# Patient Record
Sex: FEMALE | Race: WHITE | NOT HISPANIC OR LATINO | Employment: OTHER | URBAN - METROPOLITAN AREA
[De-identification: names, ages, dates, MRNs, and addresses within clinical notes are randomized per-mention and may not be internally consistent; named-entity substitution may affect disease eponyms.]

---

## 2018-10-04 ENCOUNTER — APPOINTMENT (OUTPATIENT)
Dept: WOMENS IMAGING | Facility: HOSPITAL | Age: 73
End: 2018-10-04

## 2018-10-04 PROCEDURE — 77067 SCR MAMMO BI INCL CAD: CPT | Performed by: RADIOLOGY

## 2022-04-19 ENCOUNTER — OFFICE VISIT (OUTPATIENT)
Dept: FAMILY MEDICINE CLINIC | Facility: CLINIC | Age: 77
End: 2022-04-19

## 2022-04-19 VITALS
WEIGHT: 233.7 LBS | TEMPERATURE: 96.8 F | OXYGEN SATURATION: 98 % | SYSTOLIC BLOOD PRESSURE: 146 MMHG | BODY MASS INDEX: 39.9 KG/M2 | HEART RATE: 105 BPM | HEIGHT: 64 IN | DIASTOLIC BLOOD PRESSURE: 92 MMHG

## 2022-04-19 DIAGNOSIS — I89.0 LYMPHEDEMA OF BOTH LOWER EXTREMITIES: ICD-10-CM

## 2022-04-19 DIAGNOSIS — R15.9 INCONTINENCE OF FECES, UNSPECIFIED FECAL INCONTINENCE TYPE: ICD-10-CM

## 2022-04-19 DIAGNOSIS — K58.2 IRRITABLE BOWEL SYNDROME WITH BOTH CONSTIPATION AND DIARRHEA: Primary | ICD-10-CM

## 2022-04-19 DIAGNOSIS — G43.019 INTRACTABLE MIGRAINE WITHOUT AURA AND WITHOUT STATUS MIGRAINOSUS: ICD-10-CM

## 2022-04-19 PROCEDURE — 99204 OFFICE O/P NEW MOD 45 MIN: CPT | Performed by: FAMILY MEDICINE

## 2022-04-19 PROCEDURE — 96372 THER/PROPH/DIAG INJ SC/IM: CPT | Performed by: FAMILY MEDICINE

## 2022-04-19 RX ORDER — DOXEPIN HYDROCHLORIDE 10 MG/1
10 CAPSULE ORAL NIGHTLY
COMMUNITY
End: 2022-06-09

## 2022-04-19 RX ORDER — LOSARTAN POTASSIUM 100 MG/1
100 TABLET ORAL DAILY
COMMUNITY
Start: 2022-03-23 | End: 2022-10-09 | Stop reason: SDUPTHER

## 2022-04-19 RX ORDER — MULTIPLE VITAMINS W/ MINERALS TAB 9MG-400MCG
1 TAB ORAL DAILY
COMMUNITY

## 2022-04-19 RX ORDER — LEVOTHYROXINE SODIUM 0.05 MG/1
TABLET ORAL
COMMUNITY
Start: 2022-03-31 | End: 2022-11-01 | Stop reason: SDUPTHER

## 2022-04-19 RX ORDER — FEBUXOSTAT 40 MG/1
40 TABLET, FILM COATED ORAL DAILY
COMMUNITY
End: 2022-10-17

## 2022-04-19 RX ORDER — LEVOTHYROXINE SODIUM 0.07 MG/1
TABLET ORAL
COMMUNITY
Start: 2022-03-29 | End: 2022-11-01 | Stop reason: SDUPTHER

## 2022-04-19 RX ORDER — KETOROLAC TROMETHAMINE 30 MG/ML
30 INJECTION, SOLUTION INTRAMUSCULAR; INTRAVENOUS ONCE
Status: COMPLETED | OUTPATIENT
Start: 2022-04-19 | End: 2022-04-19

## 2022-04-19 RX ORDER — SPIRONOLACTONE 25 MG/1
1 TABLET ORAL DAILY
COMMUNITY
Start: 2022-03-18 | End: 2022-05-24 | Stop reason: SDUPTHER

## 2022-04-19 RX ORDER — TOPIRAMATE 100 MG/1
TABLET, FILM COATED ORAL
COMMUNITY
Start: 2022-02-11 | End: 2022-10-11

## 2022-04-19 RX ADMIN — KETOROLAC TROMETHAMINE 30 MG: 30 INJECTION, SOLUTION INTRAMUSCULAR; INTRAVENOUS at 13:39

## 2022-04-19 NOTE — PROGRESS NOTES
Date: 2022   Patient Name: Sharon Ahn  : 1945   MRN: [id number]     Chief Complaint:    Chief Complaint   Patient presents with   • Irritable Bowel Syndrome     Patient has fecal incontinence, has constipation and diarrhea    • Fatigue   • Headache       History of Present Illness: Sharon Ahn is a 76 y.o. female who is here today for IBS, fecal incontinence, migraine, and lymphedema.  She reports she has been struggling with IBS with both constipation and diarrhea as well as fecal incontinence for 3 to 4 years.  She has had multiple abdominal surgeries including total hysterectomy, hiatal hernia repair, and incisional ventral hernia repair.  She does not feel that either constipation or diarrhea are predominant.  She has not seen GI for this or tried any medications.    She reports chronic headaches and migraines which are worse recently with the recent weather changes.  She is currently taking Topamax 50 mg in the morning and 100 mg at bedtime.  This generally helps however she has had a headache since Saturday and requests medication in office today to help it resolve.    Patient reports chronic lymphedema but has never received any treatment for this.  She does wear compression stockings daily with minimal improvement.           Review of Systems:   Review of Systems   Constitutional: Negative for chills, fatigue and fever.   Respiratory: Negative for cough and shortness of breath.    Cardiovascular: Positive for leg swelling. Negative for chest pain and palpitations.   Gastrointestinal: Positive for constipation, diarrhea and rectal pain. Negative for abdominal pain, nausea and vomiting.        Fecal incontinence   Musculoskeletal: Negative for back pain and myalgias.   Neurological: Positive for headache. Negative for dizziness and seizures.   Psychiatric/Behavioral: Negative for depressed mood. The patient is not nervous/anxious.        Past Medical History:   Past Medical History:    Diagnosis Date   • Benign essential HTN    • Chest pain 2004    NO SIG CZ BY IVUS   • Chronic edema    • Diverticulosis     FIBER   • DJD (degenerative joint disease)    • DM (diabetes mellitus) (HCC)    • GERD (gastroesophageal reflux disease)    • Gout    • Hiatal hernia    • HLP (hyperkeratosis lenticularis perstans)    • Hypothyroidism    • Lymphedema of lower extremity    • Migraine    • Seizure disorder (HCC)    • Sleep apnea    • UTI (urinary tract infection)        Past Surgical History:   Past Surgical History:   Procedure Laterality Date   • APPENDECTOMY      INCIDENTAL   • CHOLECYSTECTOMY      OPEN   • HERNIA REPAIR      REPAIR W/DONUT  DONUT REMOVAL  2019 [facility]   • HYSTERECTOMY     • LIVER BIOPSY         Family History:   Family History   Problem Relation Age of Onset   • Alzheimer's disease Mother    • Heart attack Father    • Stroke Sister    • Other Brother         GLIOBLASTOMA       Social History:   Social History     Socioeconomic History   • Marital status:      Spouse name: Familia   • Number of children: 0   Tobacco Use   • Smoking status: Never Smoker   • Smokeless tobacco: Never Used   Vaping Use   • Vaping Use: Never used   Substance and Sexual Activity   • Alcohol use: Never   • Drug use: Never   • Sexual activity: Not Currently       Medications:     Current Outpatient Medications:   •  doxepin (SINEquan) 10 MG capsule, Take 10 mg by mouth Every Night., Disp: , Rfl:   •  febuxostat (ULORIC) 40 MG tablet, Take 40 mg by mouth Daily., Disp: , Rfl:   •  levothyroxine (SYNTHROID, LEVOTHROID) 50 MCG tablet, TAKE 1 TABLET BY ORAL ROUTE EVERY OTHER DAY ON EVEN NUMBER DAYS, Disp: , Rfl:   •  levothyroxine (SYNTHROID, LEVOTHROID) 75 MCG tablet, TAKE 1 TABLET BY ORAL ROUTE EVERY DAY ON ODD NUMBERED DAYS, Disp: , Rfl:   •  losartan (COZAAR) 100 MG tablet, Take 100 mg by mouth Daily., Disp: , Rfl:   •  metFORMIN (GLUCOPHAGE) 500 MG tablet, TAKE ONE TABLET BY MOUTH TWO TIMES A DAY- needs  "appointment, Disp: , Rfl:   •  multivitamin with minerals (SENTRY SENIOR PO), Take 1 tablet by mouth Daily., Disp: , Rfl:   •  spironolactone (ALDACTONE) 25 MG tablet, Take 1 tablet by mouth Daily., Disp: , Rfl:   •  topiramate (TOPAMAX) 100 MG tablet, TAKE ONE TABLET BY MOUTH EVERY MORNING AND TAKE ONE TABLET BY MOUTH EV WARREN EVENING, Disp: , Rfl:     Current Facility-Administered Medications:   •  ketorolac (TORADOL) injection 30 mg, 30 mg, Intramuscular, Once, Sissy Moody DO    Allergies:   Allergies   Allergen Reactions   • Codeine Unknown - Low Severity   • Amoxicillin Rash         Physical Exam:  Vital Signs:   Vitals:    04/19/22 1302   BP: 146/92   BP Location: Right arm   Patient Position: Sitting   Cuff Size: Large Adult   Pulse: 105   Temp: 96.8 °F (36 °C)   TempSrc: Temporal   SpO2: 98%   Weight: 106 kg (233 lb 11.2 oz)   Height: 162.6 cm (64\")     Body mass index is 40.11 kg/m².     Physical Exam  Vitals and nursing note reviewed.   Constitutional:       Appearance: Normal appearance. She is obese.   HENT:      Head: Normocephalic and atraumatic.      Right Ear: Ear canal normal. A middle ear effusion is present.      Left Ear: Ear canal normal. A middle ear effusion is present.   Eyes:      Conjunctiva/sclera: Conjunctivae normal.      Pupils: Pupils are equal, round, and reactive to light.   Cardiovascular:      Rate and Rhythm: Normal rate and regular rhythm.      Heart sounds: Normal heart sounds. No murmur heard.  Pulmonary:      Effort: Pulmonary effort is normal.      Breath sounds: Normal breath sounds. No wheezing.   Abdominal:      General: Bowel sounds are normal.      Palpations: Abdomen is soft.   Musculoskeletal:      Cervical back: Normal range of motion.   Lymphadenopathy:      Cervical: No cervical adenopathy.   Skin:     General: Skin is warm.      Findings: No erythema or rash.      Comments: Lymphedema of bilateral lower extremities   Neurological:      Mental Status: " She is alert and oriented to person, place, and time. Mental status is at baseline.   Psychiatric:         Mood and Affect: Mood normal.         Behavior: Behavior normal.           Assessment/Plan:   Diagnoses and all orders for this visit:    1. Irritable bowel syndrome with both constipation and diarrhea (Primary)  Assessment & Plan:  Since she is also having fecal incontinence I have referred her to GI for further work-up.    Orders:  -     Ambulatory Referral to Gastroenterology    2. Incontinence of feces, unspecified fecal incontinence type  Assessment & Plan:  Referral to GI as above.    Orders:  -     Ambulatory Referral to Gastroenterology    3. Lymphedema of both lower extremities  Assessment & Plan:  I will send her to the lymphedema clinic with Dr. Sheehan to discuss options for treatment    Orders:  -     Ambulatory Referral to Wound Clinic    4. Intractable migraine without aura and without status migrainosus  Assessment & Plan:  Toradol in office today.  Continue Topamax as prescribed.  Call if persistent or worsening      Orders:  -     ketorolac (TORADOL) injection 30 mg         Follow Up:   Return in about 1 month (around 5/19/2022) for Annual physical, Medicare Wellness.    Sissy Moody,   [facility]

## 2022-05-23 ENCOUNTER — TELEPHONE (OUTPATIENT)
Dept: FAMILY MEDICINE CLINIC | Facility: CLINIC | Age: 77
End: 2022-05-23

## 2022-05-23 NOTE — TELEPHONE ENCOUNTER
Patient is requesting a med refill for Spironolactone 25 mg to be sent to [facility] on [address] [facility].  She has 2 tablets left.

## 2022-05-24 RX ORDER — SPIRONOLACTONE 25 MG/1
25 TABLET ORAL DAILY
Qty: 30 TABLET | Refills: 0 | Status: SHIPPED | OUTPATIENT
Start: 2022-05-24 | End: 2022-06-27

## 2022-06-09 ENCOUNTER — OFFICE VISIT (OUTPATIENT)
Dept: FAMILY MEDICINE CLINIC | Facility: CLINIC | Age: 77
End: 2022-06-09

## 2022-06-09 VITALS
OXYGEN SATURATION: 96 % | WEIGHT: 232.2 LBS | DIASTOLIC BLOOD PRESSURE: 84 MMHG | BODY MASS INDEX: 39.64 KG/M2 | HEART RATE: 84 BPM | HEIGHT: 64 IN | TEMPERATURE: 96.8 F | SYSTOLIC BLOOD PRESSURE: 128 MMHG

## 2022-06-09 DIAGNOSIS — E55.9 VITAMIN D DEFICIENCY: ICD-10-CM

## 2022-06-09 DIAGNOSIS — Z12.31 ENCOUNTER FOR SCREENING MAMMOGRAM FOR MALIGNANT NEOPLASM OF BREAST: ICD-10-CM

## 2022-06-09 DIAGNOSIS — Z00.00 ENCOUNTER FOR WELLNESS EXAMINATION IN ADULT: Primary | ICD-10-CM

## 2022-06-09 DIAGNOSIS — R25.2 LEG CRAMPS: ICD-10-CM

## 2022-06-09 DIAGNOSIS — R53.83 FATIGUE, UNSPECIFIED TYPE: ICD-10-CM

## 2022-06-09 DIAGNOSIS — N95.1 MENOPAUSAL SYNDROME: ICD-10-CM

## 2022-06-09 PROBLEM — Z53.20 BREAST SCREENING DECLINED: Status: RESOLVED | Noted: 2022-06-09 | Resolved: 2022-06-09

## 2022-06-09 PROBLEM — Z53.20 BREAST SCREENING DECLINED: Status: ACTIVE | Noted: 2022-06-09

## 2022-06-09 PROCEDURE — 36415 COLL VENOUS BLD VENIPUNCTURE: CPT | Performed by: FAMILY MEDICINE

## 2022-06-09 PROCEDURE — 96160 PT-FOCUSED HLTH RISK ASSMT: CPT | Performed by: FAMILY MEDICINE

## 2022-06-09 PROCEDURE — 1159F MED LIST DOCD IN RCRD: CPT | Performed by: FAMILY MEDICINE

## 2022-06-09 PROCEDURE — 1170F FXNL STATUS ASSESSED: CPT | Performed by: FAMILY MEDICINE

## 2022-06-09 PROCEDURE — 99397 PER PM REEVAL EST PAT 65+ YR: CPT | Performed by: FAMILY MEDICINE

## 2022-06-09 PROCEDURE — G0439 PPPS, SUBSEQ VISIT: HCPCS | Performed by: FAMILY MEDICINE

## 2022-06-09 NOTE — PROGRESS NOTES
QUICK REFERENCE INFORMATION:  The ABCs of the Annual Wellness Visit    Subsequent Medicare Wellness Visit    @[email or web address]@    HEALTH RISK ASSESSMENT    1945    Recent Hospitalizations:  No hospitalization(s) within the last year..        Current Medical Providers:  Patient Care Team:  Sissy Moody DO as PCP - General (Family Medicine)  Francisco Montelongo MD as Consulting Physician (Family Medicine)        Smoking Status:  Social History     Tobacco Use   Smoking Status Never Smoker   Smokeless Tobacco Never Used       Alcohol Consumption:  Social History     Substance and Sexual Activity   Alcohol Use Never       Depression Screen:   PHQ-2/PHQ-9 Depression Screening 4/19/2022   Little Interest or Pleasure in Doing Things 0-->not at all   Feeling Down, Depressed or Hopeless 0-->not at all   PHQ-9: Brief Depression Severity Measure Score 0       Health Habits and Functional and Cognitive Screening:  Functional & Cognitive Status 6/9/2022   Do you have difficulty preparing food and eating? No   Do you have difficulty bathing yourself, getting dressed or grooming yourself? No   Do you have difficulty using the toilet? No   Do you have difficulty moving around from place to place? No   Do you have trouble with steps or getting out of a bed or a chair? No   Current Diet Limited Junk Food   Dental Exam Up to date   Eye Exam Up to date   Exercise (times per week) 0 times per week   Current Exercises Include No Regular Exercise   Do you need help using the phone?  No   Are you deaf or do you have serious difficulty hearing?  No   Do you need help with transportation? No   Do you need help shopping? No   Do you need help preparing meals?  No   Do you need help with housework?  No   Do you need help with laundry? No   Do you need help taking your medications? No   Do you need help managing money? No   Do you ever drive or ride in a car without wearing a seat belt? No   Have you felt unusual stress, anger or  loneliness in the last month? No   Who do you live with? Spouse   If you need help, do you have trouble finding someone available to you? No   Have you been bothered in the last four weeks by sexual problems? No   Do you have difficulty concentrating, remembering or making decisions? No       Fall Risk Screen:  OG Fall Risk Assessment was completed, and patient is at LOW risk for falls.Assessment completed on:6/9/2022    ACE III MINI        Does the patient have evidence of cognitive impairment? No    Aspirin use counseling: Taking ASA appropriately as indicated    Recent Lab Results:  CMP:     HbA1c:  No results found for: HGBA1C  Microalbumin:  No results found for: MICROALBUR, POCMALB, POCCREAT  Lipid Panel  No results found for: CHOL, TRIG, HDL, LDL, AST, ALT    Visual Acuity:  No exam data present    Age-appropriate Screening Schedule:  Refer to the list below for future screening recommendations based on patient's age, sex and/or medical conditions. Orders for these recommended tests are listed in the plan section. The patient has been provided with a written plan.    Health Maintenance   Topic Date Due   • URINE MICROALBUMIN  Never done   • TDAP/TD VACCINES (1 - Tdap) Never done   • ZOSTER VACCINE (1 of 2) 12/07/1994   • DXA SCAN  10/04/2020   • HEMOGLOBIN A1C  04/18/2022   • DIABETIC EYE EXAM  Never done   • INFLUENZA VACCINE  08/01/2022        Subjective   History of Present Illness    Sharon Ahn is a 76 y.o. female who presents for a Subsequent Wellness Visit and annual physical.  She is due for mammogram and DEXA.  She is up-to-date on colon screening although continues to have diarrhea with some fecal incontinence.  She did see GI in [place] who sent her for a KUB but she reports she has never heard from them about the results or any follow-up.  She complains today of leg cramps worse at night as well as significant persistent chronic fatigue.    CHRONIC CONDITIONS    The following portions of  the patient's history were reviewed and updated as appropriate: allergies, current medications, past family history, past medical history, past social history, past surgical history and problem list.    Outpatient Medications Prior to Visit   Medication Sig Dispense Refill   • febuxostat (ULORIC) 40 MG tablet Take 40 mg by mouth Daily. Take one tab 4 times a week     • levothyroxine (SYNTHROID, LEVOTHROID) 50 MCG tablet TAKE 1 TABLET BY ORAL ROUTE EVERY OTHER DAY ON EVEN NUMBER DAYS     • levothyroxine (SYNTHROID, LEVOTHROID) 75 MCG tablet TAKE 1 TABLET BY ORAL ROUTE EVERY DAY ON ODD NUMBERED DAYS     • losartan (COZAAR) 100 MG tablet Take 100 mg by mouth Daily.     • metFORMIN (GLUCOPHAGE) 500 MG tablet TAKE ONE TABLET BY MOUTH TWO TIMES A DAY- needs appointment     • multivitamin with minerals tablet tablet Take 1 tablet by mouth Daily.     • spironolactone (ALDACTONE) 25 MG tablet Take 1 tablet by mouth Daily. 30 tablet 0   • topiramate (TOPAMAX) 100 MG tablet TAKE ONE TABLET BY MOUTH EVERY MORNING AND TAKE ONE TABLET BY MOUTH EV WARREN EVENING     • doxepin (SINEquan) 10 MG capsule Take 10 mg by mouth Every Night.       No facility-administered medications prior to visit.       Patient Active Problem List   Diagnosis   • Benign essential hypertension   • Gout   • Hypothyroidism   • Urinary tract infectious disease   • Irritable bowel syndrome with both constipation and diarrhea   • Incontinence of feces   • Lymphedema of both lower extremities   • Intractable migraine without aura and without status migrainosus   • Encounter for wellness examination in adult   • Encounter for screening mammogram for malignant neoplasm of breast   • Menopausal syndrome   • Vitamin D deficiency   • Fatigue   • Leg cramps       Advance Care Planning:  ACP discussion was held with the patient during this visit. Patient has an advance directive (not in EMR), copy requested.    Identification of Risk Factors:  Risk factors include:  Advance Directive Discussion  Breast Cancer/Mammogram Screening  Fall Risk  Obesity/Overweight   Osteoporosis Risk.    Review of Systems   Constitutional: Positive for fatigue. Negative for chills.   Respiratory: Negative for cough, shortness of breath and wheezing.    Cardiovascular: Negative for chest pain and leg swelling.   Gastrointestinal: Positive for diarrhea. Negative for abdominal pain, constipation, nausea and vomiting.   Musculoskeletal: Positive for myalgias. Negative for arthralgias.   Skin: Negative for rash.   Neurological: Negative for dizziness and headaches.   Psychiatric/Behavioral: Negative for confusion. The patient is not nervous/anxious.        Compared to one year ago, the patient feels her physical health is worse.  Compared to one year ago, the patient feels her mental health is the same.    Objective     Physical Exam  Vitals and nursing note reviewed.   Constitutional:       Appearance: Normal appearance. She is obese.   HENT:      Head: Normocephalic and atraumatic.      Right Ear: Ear canal normal. A middle ear effusion is present.      Left Ear: Ear canal normal. A middle ear effusion is present.      Nose: Nose normal.      Mouth/Throat:      Mouth: Mucous membranes are moist.      Pharynx: Oropharynx is clear.   Eyes:      Conjunctiva/sclera: Conjunctivae normal.      Pupils: Pupils are equal, round, and reactive to light.   Cardiovascular:      Rate and Rhythm: Normal rate and regular rhythm.      Heart sounds: Normal heart sounds. No murmur heard.  Pulmonary:      Effort: Pulmonary effort is normal.      Breath sounds: Normal breath sounds. No wheezing, rhonchi or rales.   Abdominal:      General: Bowel sounds are normal.      Palpations: Abdomen is soft.      Tenderness: There is no abdominal tenderness.   Musculoskeletal:         General: Normal range of motion.      Cervical back: Normal range of motion and neck supple.   Lymphadenopathy:      Cervical: No cervical adenopathy.  "  Skin:     General: Skin is warm.   Neurological:      General: No focal deficit present.      Mental Status: She is alert and oriented to person, place, and time.      Motor: No weakness.   Psychiatric:         Mood and Affect: Mood normal.         Behavior: Behavior normal.          Procedures     Vitals:    06/09/22 1022   BP: 128/84   BP Location: Left arm   Patient Position: Sitting   Cuff Size: Large Adult   Pulse: 84   Temp: 96.8 °F (36 °C)   TempSrc: Temporal   SpO2: 96%   Weight: 105 kg (232 lb 3.2 oz)   Height: 162.6 cm (64\")       Class 2 Severe Obesity (BMI >=35 and <=39.9). Obesity-related health conditions include the following: hypertension, diabetes mellitus and dyslipidemias. Obesity is unchanged. BMI is is above average; BMI management plan is completed. We discussed portion control and increasing exercise.      No results found for: WBC, HGB, HCT, MCV, PLT  No results found for: GLUCOSE, BUN, CREATININE, EGFRIFNONA, EGFRIFAFRI, BCR, K, CO2, CALCIUM, PROTENTOTREF, ALBUMIN, LABIL2, BILIRUBIN, AST, ALT  No results found for: TSH  No results found for: PSA  No results found for: CHOL, CHLPL, TRIG, HDL, LDL, LDLDIRECT    Assessment & Plan   Problem List Items Addressed This Visit        Endocrine and Metabolic    Vitamin D deficiency    Relevant Orders    Vitamin D 25 Hydroxy       Genitourinary and Reproductive     Encounter for screening mammogram for malignant neoplasm of breast    Relevant Orders    Mammo Screening Bilateral With CAD    Menopausal syndrome    Relevant Orders    DEXA Bone Density Axial       Health Encounters    Encounter for wellness examination in adult - Primary    Current Assessment & Plan     Fasting labs today.  Mammogram and DEXA ordered.           Relevant Orders    CBC Auto Differential    Comprehensive Metabolic Panel    Hemoglobin A1c    Lipid Panel    TSH       Symptoms and Signs    Fatigue    Relevant Orders    Vitamin B12    Leg cramps    Relevant Orders    Magnesium "        Patient Self-Management and Personalized Health Advice  The patient has been provided with information about: diet, exercise, weight management and fall prevention and preventive services including:   · Annual Wellness Visit (AWV)  · Bone Density Measurements  · Screening Mammography .    Outpatient Encounter Medications as of 6/9/2022   Medication Sig Dispense Refill   • febuxostat (ULORIC) 40 MG tablet Take 40 mg by mouth Daily. Take one tab 4 times a week     • levothyroxine (SYNTHROID, LEVOTHROID) 50 MCG tablet TAKE 1 TABLET BY ORAL ROUTE EVERY OTHER DAY ON EVEN NUMBER DAYS     • levothyroxine (SYNTHROID, LEVOTHROID) 75 MCG tablet TAKE 1 TABLET BY ORAL ROUTE EVERY DAY ON ODD NUMBERED DAYS     • losartan (COZAAR) 100 MG tablet Take 100 mg by mouth Daily.     • metFORMIN (GLUCOPHAGE) 500 MG tablet TAKE ONE TABLET BY MOUTH TWO TIMES A DAY- needs appointment     • multivitamin with minerals tablet tablet Take 1 tablet by mouth Daily.     • spironolactone (ALDACTONE) 25 MG tablet Take 1 tablet by mouth Daily. 30 tablet 0   • topiramate (TOPAMAX) 100 MG tablet TAKE ONE TABLET BY MOUTH EVERY MORNING AND TAKE ONE TABLET BY MOUTH EV WARREN EVENING     • [DISCONTINUED] doxepin (SINEquan) 10 MG capsule Take 10 mg by mouth Every Night.       No facility-administered encounter medications on file as of 6/9/2022.       Reviewed use of high risk medication in the elderly: yes  Reviewed for potential of harmful drug interactions in the elderly: yes      Diagnoses and all orders for this visit:    1. Encounter for wellness examination in adult (Primary)  Assessment & Plan:  Fasting labs today.  Mammogram and DEXA ordered.    Orders:  -     CBC Auto Differential; Future  -     Comprehensive Metabolic Panel; Future  -     Hemoglobin A1c; Future  -     Lipid Panel; Future  -     TSH; Future    2. Encounter for screening mammogram for malignant neoplasm of breast  -     Mammo Screening Bilateral With CAD; Future    3. Menopausal  syndrome  -     DEXA Bone Density Axial; Future    4. Vitamin D deficiency  -     Vitamin D 25 Hydroxy; Future    5. Fatigue, unspecified type  -     Vitamin B12; Future    6. Leg cramps  -     Magnesium; Future        Follow Up:  Return in about 6 months (around 12/9/2022) for Med Recheck.     There are no Patient Instructions on file for this visit.    An After Visit Summary and PPPS with all of these plans were given to the patient.

## 2022-06-10 LAB
25(OH)D3+25(OH)D2 SERPL-MCNC: 29.3 NG/ML (ref 30–100)
ALBUMIN SERPL-MCNC: 4.4 G/DL (ref 3.7–4.7)
ALBUMIN/GLOB SERPL: 2 {RATIO} (ref 1.2–2.2)
ALP SERPL-CCNC: 82 IU/L (ref 44–121)
ALT SERPL-CCNC: 13 IU/L (ref 0–32)
AST SERPL-CCNC: 17 IU/L (ref 0–40)
BASOPHILS # BLD AUTO: 0.1 X10E3/UL (ref 0–0.2)
BASOPHILS NFR BLD AUTO: 1 %
BILIRUB SERPL-MCNC: 0.5 MG/DL (ref 0–1.2)
BUN SERPL-MCNC: 19 MG/DL (ref 8–27)
BUN/CREAT SERPL: 20 (ref 12–28)
CALCIUM SERPL-MCNC: 10 MG/DL (ref 8.7–10.3)
CHLORIDE SERPL-SCNC: 108 MMOL/L (ref 96–106)
CHOLEST SERPL-MCNC: 181 MG/DL (ref 100–199)
CO2 SERPL-SCNC: 20 MMOL/L (ref 20–29)
CREAT SERPL-MCNC: 0.95 MG/DL (ref 0.57–1)
EGFRCR SERPLBLD CKD-EPI 2021: 62 ML/MIN/1.73
EOSINOPHIL # BLD AUTO: 0.4 X10E3/UL (ref 0–0.4)
EOSINOPHIL NFR BLD AUTO: 8 %
ERYTHROCYTE [DISTWIDTH] IN BLOOD BY AUTOMATED COUNT: 13 % (ref 11.7–15.4)
GLOBULIN SER CALC-MCNC: 2.2 G/DL (ref 1.5–4.5)
GLUCOSE SERPL-MCNC: 158 MG/DL (ref 65–99)
HBA1C MFR BLD: 7.3 % (ref 4.8–5.6)
HCT VFR BLD AUTO: 41.5 % (ref 34–46.6)
HDLC SERPL-MCNC: 60 MG/DL
HGB BLD-MCNC: 14.1 G/DL (ref 11.1–15.9)
IMM GRANULOCYTES # BLD AUTO: 0 X10E3/UL (ref 0–0.1)
IMM GRANULOCYTES NFR BLD AUTO: 0 %
LDLC SERPL CALC-MCNC: 88 MG/DL (ref 0–99)
LYMPHOCYTES # BLD AUTO: 2.1 X10E3/UL (ref 0.7–3.1)
LYMPHOCYTES NFR BLD AUTO: 38 %
MAGNESIUM SERPL-MCNC: 1.7 MG/DL (ref 1.6–2.3)
MCH RBC QN AUTO: 31.8 PG (ref 26.6–33)
MCHC RBC AUTO-ENTMCNC: 34 G/DL (ref 31.5–35.7)
MCV RBC AUTO: 94 FL (ref 79–97)
MONOCYTES # BLD AUTO: 0.4 X10E3/UL (ref 0.1–0.9)
MONOCYTES NFR BLD AUTO: 7 %
NEUTROPHILS # BLD AUTO: 2.6 X10E3/UL (ref 1.4–7)
NEUTROPHILS NFR BLD AUTO: 46 %
PLATELET # BLD AUTO: 189 X10E3/UL (ref 150–450)
POTASSIUM SERPL-SCNC: 4.3 MMOL/L (ref 3.5–5.2)
PROT SERPL-MCNC: 6.6 G/DL (ref 6–8.5)
RBC # BLD AUTO: 4.43 X10E6/UL (ref 3.77–5.28)
SODIUM SERPL-SCNC: 143 MMOL/L (ref 134–144)
TRIGL SERPL-MCNC: 199 MG/DL (ref 0–149)
TSH SERPL DL<=0.005 MIU/L-ACNC: 1.22 UIU/ML (ref 0.45–4.5)
VIT B12 SERPL-MCNC: 530 PG/ML (ref 232–1245)
VLDLC SERPL CALC-MCNC: 33 MG/DL (ref 5–40)
WBC # BLD AUTO: 5.6 X10E3/UL (ref 3.4–10.8)

## 2022-06-27 RX ORDER — SPIRONOLACTONE 25 MG/1
25 TABLET ORAL DAILY
Qty: 30 TABLET | Refills: 0 | Status: SHIPPED | OUTPATIENT
Start: 2022-06-27 | End: 2022-08-19

## 2022-08-19 RX ORDER — SPIRONOLACTONE 25 MG/1
25 TABLET ORAL DAILY
Qty: 30 TABLET | Refills: 0 | Status: SHIPPED | OUTPATIENT
Start: 2022-08-19 | End: 2022-09-14

## 2022-09-14 RX ORDER — SPIRONOLACTONE 25 MG/1
25 TABLET ORAL DAILY
Qty: 30 TABLET | Refills: 0 | Status: SHIPPED | OUTPATIENT
Start: 2022-09-14 | End: 2022-10-28

## 2022-10-09 RX ORDER — LOSARTAN POTASSIUM 100 MG/1
TABLET ORAL
Qty: 30 TABLET | Refills: 11 | Status: SHIPPED | OUTPATIENT
Start: 2022-10-09

## 2022-10-11 RX ORDER — TOPIRAMATE 100 MG/1
TABLET, FILM COATED ORAL
Qty: 60 TABLET | Refills: 5 | Status: SHIPPED | OUTPATIENT
Start: 2022-10-11 | End: 2023-01-04 | Stop reason: SDUPTHER

## 2022-10-17 RX ORDER — FEBUXOSTAT 40 MG/1
TABLET, FILM COATED ORAL
Qty: 30 TABLET | Refills: 11 | Status: SHIPPED | OUTPATIENT
Start: 2022-10-17

## 2022-10-21 ENCOUNTER — TELEPHONE (OUTPATIENT)
Dept: FAMILY MEDICINE CLINIC | Facility: CLINIC | Age: 77
End: 2022-10-21

## 2022-10-21 NOTE — TELEPHONE ENCOUNTER
Caller: Sharon Ahn    Relationship to patient: Self    Best call back number: [phone]    Chief complaint: RIGHT LEG PAIN    Type of visit: SAME DAY OR OFFICE VISIT     Requested date: ASAP    If rescheduling, when is the original appointment: N/A    Additional notes: PATIENT HAS BEEN HAVING RIGHT LEG PAIN SINCE [place] 10/16/2022. IT HURTS WHEN SHE STANDS, WALKS, GETS UP FROM SITTING AND WHEN BENDING. PATIENT ONLY WANTS TO SEE KOBI GASPAR BUT WANTS TO BE SEEN SOONER THAN 11/16/22 AS THAT SHOWED THE NEXT AVAILABLE. PLEASE ADVISE

## 2022-10-28 RX ORDER — SPIRONOLACTONE 25 MG/1
25 TABLET ORAL DAILY
Qty: 30 TABLET | Refills: 0 | Status: SHIPPED | OUTPATIENT
Start: 2022-10-28 | End: 2022-11-28

## 2022-11-01 ENCOUNTER — OFFICE VISIT (OUTPATIENT)
Dept: FAMILY MEDICINE CLINIC | Facility: CLINIC | Age: 77
End: 2022-11-01

## 2022-11-01 VITALS
DIASTOLIC BLOOD PRESSURE: 70 MMHG | BODY MASS INDEX: 39.27 KG/M2 | SYSTOLIC BLOOD PRESSURE: 126 MMHG | WEIGHT: 230 LBS | HEART RATE: 110 BPM | HEIGHT: 64 IN | OXYGEN SATURATION: 97 %

## 2022-11-01 DIAGNOSIS — Z23 NEED FOR VACCINATION: ICD-10-CM

## 2022-11-01 DIAGNOSIS — E03.9 ACQUIRED HYPOTHYROIDISM: ICD-10-CM

## 2022-11-01 DIAGNOSIS — M79.604 RIGHT LEG PAIN: Primary | ICD-10-CM

## 2022-11-01 DIAGNOSIS — K58.0 IRRITABLE BOWEL SYNDROME WITH DIARRHEA: ICD-10-CM

## 2022-11-01 DIAGNOSIS — K64.9 HEMORRHOIDS, UNSPECIFIED HEMORRHOID TYPE: ICD-10-CM

## 2022-11-01 PROCEDURE — 99214 OFFICE O/P EST MOD 30 MIN: CPT | Performed by: FAMILY MEDICINE

## 2022-11-01 PROCEDURE — 90662 IIV NO PRSV INCREASED AG IM: CPT | Performed by: FAMILY MEDICINE

## 2022-11-01 PROCEDURE — G0008 ADMIN INFLUENZA VIRUS VAC: HCPCS | Performed by: FAMILY MEDICINE

## 2022-11-01 RX ORDER — LEVOTHYROXINE SODIUM 0.07 MG/1
75 TABLET ORAL
Qty: 90 TABLET | Refills: 1 | Status: SHIPPED | OUTPATIENT
Start: 2022-11-01

## 2022-11-01 RX ORDER — HYDROCORTISONE 25 MG/G
CREAM TOPICAL 2 TIMES DAILY
Qty: 28 G | Refills: 5 | Status: SHIPPED | OUTPATIENT
Start: 2022-11-01

## 2022-11-01 RX ORDER — LEVOTHYROXINE SODIUM 0.05 MG/1
50 TABLET ORAL
Qty: 90 TABLET | Refills: 1 | Status: SHIPPED | OUTPATIENT
Start: 2022-11-01

## 2022-11-01 RX ORDER — DICYCLOMINE HYDROCHLORIDE 10 MG/1
10 CAPSULE ORAL
Qty: 90 CAPSULE | Refills: 1 | Status: SHIPPED | OUTPATIENT
Start: 2022-11-01

## 2022-11-01 NOTE — ASSESSMENT & PLAN NOTE
Unclear etiology but with family history of DVTs I will go ahead and obtain a venous Doppler of her right lower extremity

## 2022-11-01 NOTE — PROGRESS NOTES
Date: 2022   Patient Name: Sharon Ahn  : 1945   MRN: [id number]     Chief Complaint:    Chief Complaint   Patient presents with   • Right leg pain       History of Present Illness: Sharon Ahn is a 76 y.o. female who is here today for Right lower extremity pain.  She reports this started a few weeks ago.  She is unable to describe the pain but reports that it hurts to walk, lift her right leg, or go up stairs.  The pain is in the posterior aspect of the right lower extremity and she denies recent injury or fall, overlying skin changes, or personal history of DVT.  She has been seen and treated at urgent care with Toradol and Medrol pack without improvement.  She does have a family history significant with multiple family members with DVT.    She also complains of IBS diarrhea with worsening hemorrhoids recently due to increased amount of bowel movements.  She has not taken anything prescription for this recently but has tried Imodium over-the-counter without improvement.  She reports rectal bleeding and pain with the hemorrhoids.    She is also requesting refill of thyroid medication, most recent labs were normal.           Review of Systems:   Review of Systems   Constitutional: Negative for chills, fatigue and fever.   Respiratory: Negative for cough and shortness of breath.    Cardiovascular: Negative for chest pain and palpitations.   Gastrointestinal: Positive for anal bleeding, diarrhea and rectal pain. Negative for abdominal pain, constipation, nausea and vomiting.   Musculoskeletal: Positive for arthralgias, gait problem, joint swelling and myalgias. Negative for back pain.   Neurological: Negative for dizziness and headache.   Psychiatric/Behavioral: Negative for depressed mood. The patient is not nervous/anxious.        Past Medical History:   Past Medical History:   Diagnosis Date   • Benign essential HTN    • Chest pain     NO SIG CZ BY IVUS   • Chronic edema    •  Diverticulosis     FIBER   • DJD (degenerative joint disease)    • DM (diabetes mellitus) (HCC)    • GERD (gastroesophageal reflux disease)    • Gout    • Hiatal hernia    • HLP (hyperkeratosis lenticularis perstans)    • Hypothyroidism    • Lymphedema of lower extremity    • Migraine    • Seizure disorder (HCC)    • Sleep apnea    • UTI (urinary tract infection)        Past Surgical History:   Past Surgical History:   Procedure Laterality Date   • APPENDECTOMY      INCIDENTAL   • CHOLECYSTECTOMY      OPEN   • HERNIA REPAIR      REPAIR W/DONUT  DONUT REMOVAL  2019 [facility]   • HYSTERECTOMY     • LIVER BIOPSY         Family History:   Family History   Problem Relation Age of Onset   • Alzheimer's disease Mother    • Heart attack Father    • Stroke Sister    • Other Brother         GLIOBLASTOMA       Social History:   Social History     Socioeconomic History   • Marital status:      Spouse name: Familia   • Number of children: 0   Tobacco Use   • Smoking status: Never   • Smokeless tobacco: Never   Vaping Use   • Vaping Use: Never used   Substance and Sexual Activity   • Alcohol use: Never   • Drug use: Never   • Sexual activity: Not Currently       Medications:     Current Outpatient Medications:   •  febuxostat (ULORIC) 40 MG tablet, TAKE ONE TABLET BY MOUTH DAILY TO PREVENT GOUT, Disp: 30 tablet, Rfl: 11  •  levothyroxine (SYNTHROID, LEVOTHROID) 50 MCG tablet, Take 1 tablet by mouth Every Morning., Disp: 90 tablet, Rfl: 1  •  levothyroxine (SYNTHROID, LEVOTHROID) 75 MCG tablet, Take 1 tablet by mouth Every Morning., Disp: 90 tablet, Rfl: 1  •  dicyclomine (BENTYL) 10 MG capsule, Take 1 capsule by mouth 4 (Four) Times a Day Before Meals & at Bedtime As Needed (diarrhea)., Disp: 90 capsule, Rfl: 1  •  Hydrocortisone, Perianal, (Anusol-HC) 2.5 % rectal cream, Insert  into the rectum 2 (Two) Times a Day., Disp: 28 g, Rfl: 5  •  losartan (COZAAR) 100 MG tablet, TAKE ONE TABLET BY MOUTH DAILY, Disp: 30 tablet, Rfl:  "11  •  metFORMIN (GLUCOPHAGE) 500 MG tablet, TAKE 2 TABLETS BY MOUTH 2 TIMES A DAY WITH MEALS., Disp: 120 tablet, Rfl: 2  •  multivitamin with minerals tablet tablet, Take 1 tablet by mouth Daily., Disp: , Rfl:   •  spironolactone (ALDACTONE) 25 MG tablet, TAKE 1 TABLET BY MOUTH DAILY., Disp: 30 tablet, Rfl: 0  •  topiramate (TOPAMAX) 100 MG tablet, TAKE ONE TABLET BY MOUTH EVERY MORNING AND TAKE ONE TABLET BY MOUTH EVERY EVENING, Disp: 60 tablet, Rfl: 5    Allergies:   Allergies   Allergen Reactions   • Amoxicillin Rash   • Codeine GI Intolerance         Physical Exam:  Vital Signs:   Vitals:    11/01/22 0835   BP: 126/70   BP Location: Left arm   Patient Position: Sitting   Cuff Size: Large Adult   Pulse: 110   SpO2: 97%   Weight: 104 kg (230 lb)   Height: 162.6 cm (64\")     Body mass index is 39.48 kg/m².     Physical Exam  Vitals and nursing note reviewed.   Constitutional:       Appearance: Normal appearance.   Cardiovascular:      Rate and Rhythm: Normal rate and regular rhythm.      Heart sounds: Normal heart sounds. No murmur heard.  Pulmonary:      Effort: Pulmonary effort is normal.      Breath sounds: Normal breath sounds. No wheezing.   Musculoskeletal:      Comments: Tenderness to palpation of the posterior aspect of the right lower extremity with no palpable abnormalities or overlying skin changes.  Lymphedema present but no significant swelling compared to the other leg   Skin:     General: Skin is warm.      Findings: No rash.   Neurological:      Mental Status: She is alert and oriented to person, place, and time. Mental status is at baseline.   Psychiatric:         Mood and Affect: Mood normal.         Behavior: Behavior normal.           Assessment/Plan:   Diagnoses and all orders for this visit:    1. Right leg pain (Primary)  Assessment & Plan:  Unclear etiology but with family history of DVTs I will go ahead and obtain a venous Doppler of her right lower extremity    Orders:  -     Duplex " Venous Lower Extremity - Right CAR; Future    2. Hemorrhoids, unspecified hemorrhoid type  Assessment & Plan:  Rx topical Anusol HC    Orders:  -     Hydrocortisone, Perianal, (Anusol-HC) 2.5 % rectal cream; Insert  into the rectum 2 (Two) Times a Day.  Dispense: 28 g; Refill: 5    3. Acquired hypothyroidism  -     levothyroxine (SYNTHROID, LEVOTHROID) 75 MCG tablet; Take 1 tablet by mouth Every Morning.  Dispense: 90 tablet; Refill: 1  -     levothyroxine (SYNTHROID, LEVOTHROID) 50 MCG tablet; Take 1 tablet by mouth Every Morning.  Dispense: 90 tablet; Refill: 1    4. Irritable bowel syndrome with diarrhea  Assessment & Plan:  Rx dicyclomine 10 mg to use 4 times daily as needed for diarrhea    Orders:  -     dicyclomine (BENTYL) 10 MG capsule; Take 1 capsule by mouth 4 (Four) Times a Day Before Meals & at Bedtime As Needed (diarrhea).  Dispense: 90 capsule; Refill: 1    5. Need for vaccination  -     Fluzone High-Dose 65+yrs         Follow Up:   Return in about 3 weeks (around 11/22/2022) for Med Recheck.    Sissy Moody,   [facility] [place] [facility]

## 2022-11-02 ENCOUNTER — TELEPHONE (OUTPATIENT)
Dept: FAMILY MEDICINE CLINIC | Facility: CLINIC | Age: 77
End: 2022-11-02

## 2022-11-28 RX ORDER — SPIRONOLACTONE 25 MG/1
25 TABLET ORAL DAILY
Qty: 30 TABLET | Refills: 0 | Status: SHIPPED | OUTPATIENT
Start: 2022-11-28 | End: 2023-01-04 | Stop reason: SDUPTHER

## 2023-01-04 ENCOUNTER — OFFICE VISIT (OUTPATIENT)
Dept: FAMILY MEDICINE CLINIC | Facility: CLINIC | Age: 78
End: 2023-01-04
Payer: MEDICARE

## 2023-01-04 VITALS
HEIGHT: 64 IN | HEART RATE: 97 BPM | SYSTOLIC BLOOD PRESSURE: 122 MMHG | DIASTOLIC BLOOD PRESSURE: 84 MMHG | WEIGHT: 213.1 LBS | BODY MASS INDEX: 36.38 KG/M2 | OXYGEN SATURATION: 98 % | TEMPERATURE: 97 F

## 2023-01-04 DIAGNOSIS — K58.0 IRRITABLE BOWEL SYNDROME WITH DIARRHEA: ICD-10-CM

## 2023-01-04 DIAGNOSIS — E11.9 TYPE 2 DIABETES MELLITUS WITHOUT COMPLICATION, WITHOUT LONG-TERM CURRENT USE OF INSULIN: ICD-10-CM

## 2023-01-04 DIAGNOSIS — E55.9 VITAMIN D DEFICIENCY: ICD-10-CM

## 2023-01-04 DIAGNOSIS — E03.9 ACQUIRED HYPOTHYROIDISM: ICD-10-CM

## 2023-01-04 DIAGNOSIS — E53.8 B12 DEFICIENCY: ICD-10-CM

## 2023-01-04 DIAGNOSIS — L65.9 HAIR LOSS: Primary | ICD-10-CM

## 2023-01-04 PROCEDURE — 3074F SYST BP LT 130 MM HG: CPT | Performed by: FAMILY MEDICINE

## 2023-01-04 PROCEDURE — 36415 COLL VENOUS BLD VENIPUNCTURE: CPT | Performed by: FAMILY MEDICINE

## 2023-01-04 PROCEDURE — 1159F MED LIST DOCD IN RCRD: CPT | Performed by: FAMILY MEDICINE

## 2023-01-04 PROCEDURE — 1160F RVW MEDS BY RX/DR IN RCRD: CPT | Performed by: FAMILY MEDICINE

## 2023-01-04 PROCEDURE — 99214 OFFICE O/P EST MOD 30 MIN: CPT | Performed by: FAMILY MEDICINE

## 2023-01-04 RX ORDER — TOPIRAMATE 100 MG/1
100 TABLET, FILM COATED ORAL 2 TIMES DAILY
Qty: 180 TABLET | Refills: 1 | Status: SHIPPED | OUTPATIENT
Start: 2023-01-04

## 2023-01-04 RX ORDER — SPIRONOLACTONE 25 MG/1
25 TABLET ORAL DAILY
Qty: 90 TABLET | Refills: 1 | Status: SHIPPED | OUTPATIENT
Start: 2023-01-04

## 2023-01-04 NOTE — ASSESSMENT & PLAN NOTE
FODMAP diet discussed and posted to after visit summary.  She was encouraged to start taking dicyclomine at least 3 times daily consistently

## 2023-01-04 NOTE — ASSESSMENT & PLAN NOTE
Likely multifactorial to include age and being postmenopausal, having hypothyroidism, and being on Topamax wishes a common offending agent for hair loss.  Will obtain additional blood work today to further assess.

## 2023-01-04 NOTE — ASSESSMENT & PLAN NOTE
Diabetes is Stable.   Continue current treatment regimen.  Dietary recommendations for ADA diet.  Regular aerobic exercise.  Diabetes will be reassessed in 6 months.

## 2023-01-04 NOTE — PROGRESS NOTES
Date: 2023   Patient Name: Sharon Ahn  : 1945   MRN: [id number]     Chief Complaint:    Chief Complaint   Patient presents with   • Hypertension     Med refill        History of Present Illness: Sharon Ahn is a 77 y.o. female who is here today for Hair loss.  She reports that she has noticed losing a significant amount of hair recently both when she washes and brushes her hair as well as just sitting watching TV.  She does have a history of hypothyroidism and last labs were well controlled.  She is on Topamax for migraines and has been for many years.    She also continues to complain of irritable bowel symptoms with predominantly diarrhea.  She is not taking her dicyclomine regularly at this time.  She would like to discuss diet changes she can make to help with symptoms.           Review of Systems:   Review of Systems   Constitutional: Negative for chills, fatigue and fever.   Respiratory: Negative for cough and shortness of breath.    Cardiovascular: Negative for chest pain and palpitations.   Gastrointestinal: Negative for abdominal pain, constipation, diarrhea, nausea and vomiting.   Musculoskeletal: Negative for back pain and myalgias.   Neurological: Negative for dizziness and headache.   Psychiatric/Behavioral: Negative for depressed mood. The patient is not nervous/anxious.        Past Medical History:   Past Medical History:   Diagnosis Date   • Benign essential HTN    • Chest pain     NO SIG CZ BY IVUS   • Chronic edema    • Diverticulosis     FIBER   • DJD (degenerative joint disease)    • DM (diabetes mellitus) ([facility])    • GERD (gastroesophageal reflux disease)    • Gout    • Hiatal hernia    • HLP (hyperkeratosis lenticularis perstans)    • Hypothyroidism    • Lymphedema of lower extremity    • Migraine    • Seizure disorder (HCC)    • Sleep apnea    • UTI (urinary tract infection)        Past Surgical History:   Past Surgical History:   Procedure Laterality Date   •  APPENDECTOMY      INCIDENTAL   • CHOLECYSTECTOMY      OPEN   • HERNIA REPAIR      REPAIR W/DONUT  DONUT REMOVAL  2019 [facility]   • HYSTERECTOMY     • LIVER BIOPSY         Family History:   Family History   Problem Relation Age of Onset   • Alzheimer's disease Mother    • Heart attack Father    • Stroke Sister    • Other Brother         GLIOBLASTOMA       Social History:   Social History     Socioeconomic History   • Marital status:      Spouse name: Familia   • Number of children: 0   Tobacco Use   • Smoking status: Never   • Smokeless tobacco: Never   Vaping Use   • Vaping Use: Never used   Substance and Sexual Activity   • Alcohol use: Never   • Drug use: Never   • Sexual activity: Not Currently       Medications:     Current Outpatient Medications:   •  dicyclomine (BENTYL) 10 MG capsule, Take 1 capsule by mouth 4 (Four) Times a Day Before Meals & at Bedtime As Needed (diarrhea)., Disp: 90 capsule, Rfl: 1  •  febuxostat (ULORIC) 40 MG tablet, TAKE ONE TABLET BY MOUTH DAILY TO PREVENT GOUT, Disp: 30 tablet, Rfl: 11  •  Hydrocortisone, Perianal, (Anusol-HC) 2.5 % rectal cream, Insert  into the rectum 2 (Two) Times a Day., Disp: 28 g, Rfl: 5  •  levothyroxine (SYNTHROID, LEVOTHROID) 50 MCG tablet, Take 1 tablet by mouth Every Morning., Disp: 90 tablet, Rfl: 1  •  levothyroxine (SYNTHROID, LEVOTHROID) 75 MCG tablet, Take 1 tablet by mouth Every Morning., Disp: 90 tablet, Rfl: 1  •  losartan (COZAAR) 100 MG tablet, TAKE ONE TABLET BY MOUTH DAILY, Disp: 30 tablet, Rfl: 11  •  metFORMIN (GLUCOPHAGE) 500 MG tablet, TAKE 2 TABLETS BY MOUTH 2 TIMES A DAY WITH MEALS., Disp: 120 tablet, Rfl: 2  •  multivitamin with minerals tablet tablet, Take 1 tablet by mouth Daily., Disp: , Rfl:   •  spironolactone (ALDACTONE) 25 MG tablet, Take 1 tablet by mouth Daily., Disp: 90 tablet, Rfl: 1  •  topiramate (TOPAMAX) 100 MG tablet, Take 1 tablet by mouth 2 (Two) Times a Day. Take 1 tablet by mouth every morning and take 1 tablet  by mouth every evening., Disp: 180 tablet, Rfl: 1    Allergies:   Allergies   Allergen Reactions   • Amoxicillin Rash   • Codeine GI Intolerance       PHQ-2 Total Score: 0   PHQ-9 Total Score: 0     Physical Exam:  Vital Signs:   Vitals:    01/04/23 0916   BP: 122/84   BP Location: Left arm   Patient Position: Sitting   Cuff Size: Large Adult   Pulse: 97   Temp: 97 °F (36.1 °C)   TempSrc: Temporal   SpO2: 98%   Weight: 96.7 kg (213 lb 1.6 oz)   Height: 162.6 cm (64\")     Body mass index is 36.58 kg/m².     Physical Exam  Vitals and nursing note reviewed.   Constitutional:       Appearance: Normal appearance.   HENT:      Head: Normocephalic and atraumatic.   Cardiovascular:      Rate and Rhythm: Normal rate and regular rhythm.      Heart sounds: Normal heart sounds. No murmur heard.  Pulmonary:      Effort: Pulmonary effort is normal.      Breath sounds: Normal breath sounds. No wheezing.   Abdominal:      General: Bowel sounds are normal.      Palpations: Abdomen is soft.   Skin:     General: Skin is warm.   Neurological:      Mental Status: She is alert and oriented to person, place, and time. Mental status is at baseline.   Psychiatric:         Mood and Affect: Mood normal.         Behavior: Behavior normal.           Assessment/Plan:   Diagnoses and all orders for this visit:    1. Hair loss (Primary)  Assessment & Plan:  Likely multifactorial to include age and being postmenopausal, having hypothyroidism, and being on Topamax wishes a common offending agent for hair loss.  Will obtain additional blood work today to further assess.      2. Irritable bowel syndrome with diarrhea  Assessment & Plan:  FODMAP diet discussed and posted to after visit summary.  She was encouraged to start taking dicyclomine at least 3 times daily consistently      3. Acquired hypothyroidism  Assessment & Plan:  Stable, labs today    Orders:  -     TSH; Future  -     T4, Free; Future  -     CBC Auto Differential; Future  -      Comprehensive Metabolic Panel; Future    4. Type 2 diabetes mellitus without complication, without long-term current use of insulin ([facility])  Assessment & Plan:  Diabetes is Stable.   Continue current treatment regimen.  Dietary recommendations for ADA diet.  Regular aerobic exercise.  Diabetes will be reassessed in 6 months.    Orders:  -     Lipid Panel; Future  -     Hemoglobin A1c; Future    5. B12 deficiency  -     Vitamin B12; Future    6. Vitamin D deficiency  -     Vitamin D,25-Hydroxy; Future    Other orders  -     spironolactone (ALDACTONE) 25 MG tablet; Take 1 tablet by mouth Daily.  Dispense: 90 tablet; Refill: 1  -     topiramate (TOPAMAX) 100 MG tablet; Take 1 tablet by mouth 2 (Two) Times a Day. Take 1 tablet by mouth every morning and take 1 tablet by mouth every evening.  Dispense: 180 tablet; Refill: 1         Follow Up:   Return in about 6 months (around 7/4/2023) for Annual physical, Medicare Wellness.    Sissy Moody DO  [facility] [place] [facility]

## 2023-01-05 LAB
25(OH)D3+25(OH)D2 SERPL-MCNC: 34.6 NG/ML (ref 30–100)
ALBUMIN SERPL-MCNC: 4.5 G/DL (ref 3.7–4.7)
ALBUMIN/GLOB SERPL: 1.9 {RATIO} (ref 1.2–2.2)
ALP SERPL-CCNC: 97 IU/L (ref 44–121)
ALT SERPL-CCNC: 12 IU/L (ref 0–32)
AST SERPL-CCNC: 19 IU/L (ref 0–40)
BASOPHILS # BLD AUTO: 0.1 X10E3/UL (ref 0–0.2)
BASOPHILS NFR BLD AUTO: 1 %
BILIRUB SERPL-MCNC: 0.6 MG/DL (ref 0–1.2)
BUN SERPL-MCNC: 15 MG/DL (ref 8–27)
BUN/CREAT SERPL: 14 (ref 12–28)
CALCIUM SERPL-MCNC: 10 MG/DL (ref 8.7–10.3)
CHLORIDE SERPL-SCNC: 105 MMOL/L (ref 96–106)
CHOLEST SERPL-MCNC: 154 MG/DL (ref 100–199)
CO2 SERPL-SCNC: 21 MMOL/L (ref 20–29)
CREAT SERPL-MCNC: 1.1 MG/DL (ref 0.57–1)
EGFRCR SERPLBLD CKD-EPI 2021: 51 ML/MIN/1.73
EOSINOPHIL # BLD AUTO: 0.3 X10E3/UL (ref 0–0.4)
EOSINOPHIL NFR BLD AUTO: 3 %
ERYTHROCYTE [DISTWIDTH] IN BLOOD BY AUTOMATED COUNT: 13 % (ref 11.7–15.4)
GLOBULIN SER CALC-MCNC: 2.4 G/DL (ref 1.5–4.5)
GLUCOSE SERPL-MCNC: 138 MG/DL (ref 70–99)
HBA1C MFR BLD: 6.5 % (ref 4.8–5.6)
HCT VFR BLD AUTO: 42.7 % (ref 34–46.6)
HDLC SERPL-MCNC: 61 MG/DL
HGB BLD-MCNC: 14.3 G/DL (ref 11.1–15.9)
IMM GRANULOCYTES # BLD AUTO: 0 X10E3/UL (ref 0–0.1)
IMM GRANULOCYTES NFR BLD AUTO: 0 %
LDLC SERPL CALC-MCNC: 63 MG/DL (ref 0–99)
LYMPHOCYTES # BLD AUTO: 2.8 X10E3/UL (ref 0.7–3.1)
LYMPHOCYTES NFR BLD AUTO: 32 %
MCH RBC QN AUTO: 31.8 PG (ref 26.6–33)
MCHC RBC AUTO-ENTMCNC: 33.5 G/DL (ref 31.5–35.7)
MCV RBC AUTO: 95 FL (ref 79–97)
MONOCYTES # BLD AUTO: 0.6 X10E3/UL (ref 0.1–0.9)
MONOCYTES NFR BLD AUTO: 7 %
NEUTROPHILS # BLD AUTO: 5 X10E3/UL (ref 1.4–7)
NEUTROPHILS NFR BLD AUTO: 57 %
PLATELET # BLD AUTO: 190 X10E3/UL (ref 150–450)
POTASSIUM SERPL-SCNC: 3.9 MMOL/L (ref 3.5–5.2)
PROT SERPL-MCNC: 6.9 G/DL (ref 6–8.5)
RBC # BLD AUTO: 4.49 X10E6/UL (ref 3.77–5.28)
SODIUM SERPL-SCNC: 143 MMOL/L (ref 134–144)
T4 FREE SERPL-MCNC: 1.53 NG/DL (ref 0.82–1.77)
TRIGL SERPL-MCNC: 184 MG/DL (ref 0–149)
TSH SERPL DL<=0.005 MIU/L-ACNC: 2.17 UIU/ML (ref 0.45–4.5)
VIT B12 SERPL-MCNC: 605 PG/ML (ref 232–1245)
VLDLC SERPL CALC-MCNC: 30 MG/DL (ref 5–40)
WBC # BLD AUTO: 8.8 X10E3/UL (ref 3.4–10.8)

## 2023-01-17 NOTE — TELEPHONE ENCOUNTER
Caller: Sharon Ahn    Relationship: Self    Best call back number:    [phone]        Requested Prescriptions:   Requested Prescriptions     Pending Prescriptions Disp Refills   • metFORMIN (GLUCOPHAGE) 500 MG tablet 120 tablet 2     Sig: Take 2 tablets by mouth 2 (Two) Times a Day With Meals.        Pharmacy where request should be sent: [address] - [phone]  - [phone] FX     Additional details provided by patient:90 DAY SUPPLY    Does the patient have less than a 3 day supply:  [] Yes  [x] No    Would you like a call back once the refill request has been completed: [x] Yes [] No    If the office needs to give you a call back, can they leave a voicemail: [x] Yes [] No    Aida Lakhani Rep   01/17/23 10:29 EST

## 2023-03-27 ENCOUNTER — TELEPHONE (OUTPATIENT)
Dept: FAMILY MEDICINE CLINIC | Facility: CLINIC | Age: 78
End: 2023-03-27

## 2023-03-27 RX ORDER — FLUCONAZOLE 150 MG/1
TABLET ORAL
Qty: 2 TABLET | Refills: 1 | Status: SHIPPED | OUTPATIENT
Start: 2023-03-27

## 2023-03-27 RX ORDER — NYSTATIN 100000 [USP'U]/G
POWDER TOPICAL 3 TIMES DAILY
Qty: 60 G | Refills: 5 | Status: SHIPPED | OUTPATIENT
Start: 2023-03-27

## 2023-03-27 NOTE — TELEPHONE ENCOUNTER
Caller: Sharon Ahn    Relationship: Self    Best call back number: [phone]    What is the best time to reach you: ANYTIME    Who are you requesting to speak with (clinical staff, provider,  specific staff member): CLINICAL    Do you know the name of the person who called: PATIENT    What was the call regarding: PATIENT HAS A YEAST INFECTION UNDER BREAST. PATIENT CALLED TO SPEAK WITH KAYKAY    Do you require a callback: PLEASE ADVISE

## 2023-04-10 RX ORDER — SPIRONOLACTONE 25 MG/1
25 TABLET ORAL DAILY
Qty: 90 TABLET | Refills: 1 | Status: SHIPPED | OUTPATIENT
Start: 2023-04-10

## 2023-05-09 RX ORDER — LOSARTAN POTASSIUM 100 MG/1
TABLET ORAL
Qty: 90 TABLET | Refills: 11 | Status: SHIPPED | OUTPATIENT
Start: 2023-05-09

## 2023-05-22 DIAGNOSIS — K58.0 IRRITABLE BOWEL SYNDROME WITH DIARRHEA: ICD-10-CM

## 2023-05-22 RX ORDER — DICYCLOMINE HYDROCHLORIDE 10 MG/1
CAPSULE ORAL
Qty: 90 CAPSULE | Refills: 1 | Status: SHIPPED | OUTPATIENT
Start: 2023-05-22

## 2023-05-22 RX ORDER — TOPIRAMATE 100 MG/1
TABLET, FILM COATED ORAL
Qty: 180 TABLET | Refills: 1 | Status: SHIPPED | OUTPATIENT
Start: 2023-05-22

## 2023-08-02 ENCOUNTER — TELEPHONE (OUTPATIENT)
Dept: FAMILY MEDICINE CLINIC | Facility: CLINIC | Age: 78
End: 2023-08-02

## 2023-08-02 RX ORDER — ONDANSETRON 4 MG/1
4 TABLET, ORALLY DISINTEGRATING ORAL EVERY 8 HOURS PRN
Qty: 30 TABLET | Refills: 1 | Status: SHIPPED | OUTPATIENT
Start: 2023-08-02

## 2023-08-07 RX ORDER — LOSARTAN POTASSIUM 100 MG/1
TABLET ORAL
Qty: 60 TABLET | Refills: 11 | Status: SHIPPED | OUTPATIENT
Start: 2023-08-07

## 2023-09-19 ENCOUNTER — OFFICE VISIT (OUTPATIENT)
Dept: FAMILY MEDICINE CLINIC | Facility: CLINIC | Age: 78
End: 2023-09-19
Payer: MEDICARE

## 2023-09-19 VITALS
OXYGEN SATURATION: 98 % | HEART RATE: 97 BPM | DIASTOLIC BLOOD PRESSURE: 88 MMHG | SYSTOLIC BLOOD PRESSURE: 134 MMHG | HEIGHT: 64 IN | BODY MASS INDEX: 31.48 KG/M2 | TEMPERATURE: 96.8 F | WEIGHT: 184.4 LBS

## 2023-09-19 DIAGNOSIS — Z23 PNEUMOCOCCAL VACCINATION ADMINISTERED AT CURRENT VISIT: ICD-10-CM

## 2023-09-19 DIAGNOSIS — E55.9 VITAMIN D DEFICIENCY: ICD-10-CM

## 2023-09-19 DIAGNOSIS — F41.1 GAD (GENERALIZED ANXIETY DISORDER): ICD-10-CM

## 2023-09-19 DIAGNOSIS — Z00.00 ENCOUNTER FOR WELLNESS EXAMINATION IN ADULT: Primary | ICD-10-CM

## 2023-09-19 DIAGNOSIS — K58.0 IRRITABLE BOWEL SYNDROME WITH DIARRHEA: ICD-10-CM

## 2023-09-19 DIAGNOSIS — E11.9 TYPE 2 DIABETES MELLITUS WITHOUT COMPLICATION, WITHOUT LONG-TERM CURRENT USE OF INSULIN: ICD-10-CM

## 2023-09-19 DIAGNOSIS — I89.0 LYMPHEDEMA OF BOTH LOWER EXTREMITIES: ICD-10-CM

## 2023-09-19 DIAGNOSIS — E66.09 CLASS 1 OBESITY DUE TO EXCESS CALORIES WITH SERIOUS COMORBIDITY AND BODY MASS INDEX (BMI) OF 31.0 TO 31.9 IN ADULT: ICD-10-CM

## 2023-09-19 DIAGNOSIS — E53.8 B12 DEFICIENCY: ICD-10-CM

## 2023-09-19 PROBLEM — E66.811 CLASS 1 OBESITY DUE TO EXCESS CALORIES WITH SERIOUS COMORBIDITY AND BODY MASS INDEX (BMI) OF 31.0 TO 31.9 IN ADULT: Status: ACTIVE | Noted: 2023-09-19

## 2023-09-19 LAB
EXPIRATION DATE: ABNORMAL
Lab: ABNORMAL
POC MICROALBUMIN URINE: 20

## 2023-09-19 RX ORDER — SERTRALINE HYDROCHLORIDE 25 MG/1
25 TABLET, FILM COATED ORAL DAILY
Qty: 30 TABLET | Refills: 5 | Status: SHIPPED | OUTPATIENT
Start: 2023-09-19

## 2023-09-19 RX ORDER — DICYCLOMINE HYDROCHLORIDE 10 MG/1
CAPSULE ORAL
Qty: 90 CAPSULE | Refills: 1 | Status: SHIPPED | OUTPATIENT
Start: 2023-09-19

## 2023-09-19 RX ORDER — LANCETS 33 GAUGE
1 EACH MISCELLANEOUS DAILY
COMMUNITY
End: 2023-09-19 | Stop reason: SDUPTHER

## 2023-09-19 RX ORDER — LANCETS 33 GAUGE
1 EACH MISCELLANEOUS DAILY
Qty: 100 EACH | Refills: 12 | Status: SHIPPED | OUTPATIENT
Start: 2023-09-19

## 2023-09-19 RX ORDER — FUROSEMIDE 20 MG/1
20 TABLET ORAL 2 TIMES DAILY PRN
Qty: 60 TABLET | Refills: 5 | Status: SHIPPED | OUTPATIENT
Start: 2023-09-19

## 2023-09-19 NOTE — ASSESSMENT & PLAN NOTE
Patient's (Body mass index is 31.65 kg/m².) indicates that they are obese (BMI >30) with health conditions that include hypertension, diabetes mellitus, dyslipidemias, and osteoarthritis . Weight is unchanged. BMI  is above average; BMI management plan is completed. We discussed portion control and increasing exercise.

## 2023-09-19 NOTE — ASSESSMENT & PLAN NOTE
Psychological condition is newly identified.  Rx Zoloft 25 mg daily, side effects discussed including potential for worsening diarrhea  Psychological condition  will be reassessed in 4 weeks.

## 2023-09-19 NOTE — ASSESSMENT & PLAN NOTE
Diabetes is  Stable .   Continue current treatment regimen.  Dietary recommendations for ADA diet.  Regular aerobic exercise.  Diabetes will be reassessed in 6 months.

## 2023-09-19 NOTE — ASSESSMENT & PLAN NOTE
Imodium and Bentyl to help the patient.  I have instructed her to begin taking these regularly even if it means taking 1 every other day to avoid constipation.

## 2023-09-19 NOTE — ASSESSMENT & PLAN NOTE
She does have some mild pitting edema of bilateral feet however she was educated about lymphedema and unresponsiveness to diuretics.  I have sent in a short-term supply of Lasix 20 mg for her to use as needed for pitting edema

## 2023-09-19 NOTE — PROGRESS NOTES
The ABCs of the Annual Wellness Visit  Subsequent Medicare Wellness Visit    Subjective    Sharon Ahn is a 77 y.o. female who presents for a Subsequent Medicare Wellness Visit.  Chronic medical conditions include hypothyroidism, gout, IBS, diabetes, hypertension, hyperlipidemia, migraine, anxiety, vitamin D and B12 deficiency, and lymphedema.    The following portions of the patient's history were reviewed and   updated as appropriate: allergies, current medications, past family history, past medical history, past social history, past surgical history, and problem list.    Compared to one year ago, the patient feels her physical   health is worse.    Compared to one year ago, the patient feels her mental   health is the same.    Recent Hospitalizations:  She was not admitted to the hospital during the last year.       Current Medical Providers:  Patient Care Team:  Sissy Moody DO as PCP - General (Family Medicine)  Francisco Montelongo MD as Consulting Physician (Family Medicine)    Outpatient Medications Prior to Visit   Medication Sig Dispense Refill    febuxostat (ULORIC) 40 MG tablet TAKE ONE TABLET BY MOUTH DAILY TO PREVENT GOUT 30 tablet 11    fluconazole (Diflucan) 150 MG tablet Take 1 tab PO on day 1 then 1 tab PO on day 3 2 tablet 1    Hydrocortisone, Perianal, (Anusol-HC) 2.5 % rectal cream Insert  into the rectum 2 (Two) Times a Day. 28 g 5    levothyroxine (SYNTHROID, LEVOTHROID) 50 MCG tablet Take 1 tablet by mouth Every Morning. 90 tablet 1    levothyroxine (SYNTHROID, LEVOTHROID) 75 MCG tablet Take 1 tablet by mouth Every Morning. 90 tablet 1    losartan (COZAAR) 100 MG tablet TAKE ONE TABLET BY MOUTH DAILY 60 tablet 11    multivitamin with minerals tablet tablet Take 1 tablet by mouth Daily.      nystatin (MYCOSTATIN) 446804 UNIT/GM powder Apply  topically to the appropriate area as directed 3 (Three) Times a Day. 60 g 5    ondansetron ODT (ZOFRAN-ODT) 4 MG disintegrating tablet Place 1  tablet on the tongue Every 8 (Eight) Hours As Needed for Nausea or Vomiting. 30 tablet 1    spironolactone (ALDACTONE) 25 MG tablet TAKE 1 TABLET BY MOUTH DAILY. 90 tablet 1    topiramate (TOPAMAX) 100 MG tablet TAKE 1 TABLET BY MOUTH EVERY MORNING AND TAKE 1 TABLET BY MOUTH EVERY EVENING. 180 tablet 1    dicyclomine (BENTYL) 10 MG capsule TAKE 1 CAPSULE BY MOUTH 4 TIMES A DAY BEFORE MEALS & AT BEDTIME AS NEEDED (DIARRHEA). 90 capsule 1    glucose blood test strip 1 each by Other route Daily. Use as instructed      Lancets 33G misc Use 1 each Daily.      metFORMIN (GLUCOPHAGE) 500 MG tablet Take 2 tablets by mouth 2 (Two) Times a Day With Meals. 120 tablet 0     No facility-administered medications prior to visit.       No opioid medication identified on active medication list. I have reviewed chart for other potential  high risk medication/s and harmful drug interactions in the elderly.        Aspirin is not on active medication list.  Aspirin use is not indicated based on review of current medical condition/s. Risk of harm outweighs potential benefits.  .    Patient Active Problem List   Diagnosis    Benign essential hypertension    Gout    Hypothyroidism    Urinary tract infectious disease    Irritable bowel syndrome with diarrhea    Incontinence of feces    Lymphedema of both lower extremities    Intractable migraine without aura and without status migrainosus    Encounter for wellness examination in adult    Encounter for screening mammogram for malignant neoplasm of breast    Menopausal syndrome    Vitamin D deficiency    Fatigue    Leg cramps    Right leg pain    Hemorrhoids    B12 deficiency    Type 2 diabetes mellitus without complication, without long-term current use of insulin    Hair loss    SREE (generalized anxiety disorder)    Class 1 obesity due to excess calories with serious comorbidity and body mass index (BMI) of 31.0 to 31.9 in adult     Advance Care Planning   Advance Care Planning     Advance  "Directive is not on file.  ACP discussion was held with the patient during this visit. Patient does not have an advance directive, information provided.     Objective    Vitals:    23 0959 23 1339   BP: 140/90 134/88   BP Location: Left arm    Patient Position: Sitting    Cuff Size: Adult    Pulse: 97    Temp: 96.8 °F (36 °C)    TempSrc: Temporal    SpO2: 98%    Weight: 83.6 kg (184 lb 6.4 oz)    Height: 162.6 cm (64\")      Estimated body mass index is 31.65 kg/m² as calculated from the following:    Height as of this encounter: 162.6 cm (64\").    Weight as of this encounter: 83.6 kg (184 lb 6.4 oz).    BMI is >= 30 and <35. (Class 1 Obesity). The following options were offered after discussion;: exercise counseling/recommendations and nutrition counseling/recommendations      Does the patient have evidence of cognitive impairment? No          HEALTH RISK ASSESSMENT    Smoking Status:  Social History     Tobacco Use   Smoking Status Never   Smokeless Tobacco Never     Alcohol Consumption:  Social History     Substance and Sexual Activity   Alcohol Use Never     Fall Risk Screen:    STEADI Fall Risk Assessment was completed, and patient is at LOW risk for falls.Assessment completed on:2023    Depression Screenin/19/2023    10:02 AM   PHQ-2/PHQ-9 Depression Screening   Little Interest or Pleasure in Doing Things 0-->not at all   Feeling Down, Depressed or Hopeless 0-->not at all   PHQ-9: Brief Depression Severity Measure Score 0       Health Habits and Functional and Cognitive Screenin/19/2023    10:04 AM   Functional & Cognitive Status   Do you have difficulty preparing food and eating? No   Do you have difficulty bathing yourself, getting dressed or grooming yourself? No   Do you have difficulty using the toilet? No   Do you have difficulty moving around from place to place? No   Do you have trouble with steps or getting out of a bed or a chair? No   Current Diet Other   Dental " Exam Not up to date   Eye Exam Not up to date   Exercise (times per week) 0 times per week   Current Exercises Include No Regular Exercise   Do you need help using the phone?  No   Are you deaf or do you have serious difficulty hearing?  No   Do you need help to go to places out of walking distance? No   Do you need help shopping? No   Do you need help preparing meals?  No   Do you need help with housework?  No   Do you need help with laundry? No   Do you need help taking your medications? No   Do you need help managing money? No   Do you ever drive or ride in a car without wearing a seat belt? No   Have you felt unusual stress, anger or loneliness in the last month? No   Who do you live with? Spouse   If you need help, do you have trouble finding someone available to you? No   Have you been bothered in the last four weeks by sexual problems? No   Do you have difficulty concentrating, remembering or making decisions? Yes       Age-appropriate Screening Schedule:  Refer to the list below for future screening recommendations based on patient's age, sex and/or medical conditions. Orders for these recommended tests are listed in the plan section. The patient has been provided with a written plan.    Health Maintenance   Topic Date Due    TDAP/TD VACCINES (1 - Tdap) Never done    DIABETIC EYE EXAM  Never done    ANNUAL WELLNESS VISIT  06/09/2023    HEMOGLOBIN A1C  07/04/2023    DXA SCAN  09/19/2023 (Originally 10/4/2020)    ZOSTER VACCINE (1 of 2) 09/19/2023 (Originally 12/7/1994)    COVID-19 Vaccine (4 - Moderna series) 12/09/2023 (Originally 2/17/2022)    HEPATITIS C SCREENING  09/19/2024 (Originally 4/18/2022)    INFLUENZA VACCINE  10/01/2023    URINE MICROALBUMIN  09/19/2024    BMI FOLLOWUP  09/19/2024    Pneumococcal Vaccine 65+  Completed    COLONOSCOPY  Discontinued                  CMS Preventative Services Quick Reference  Risk Factors Identified During Encounter  Inactivity/Sedentary: Patient was advised to  exercise at least 150 minutes a week per CDC recommendations.  The above risks/problems have been discussed with the patient.  Pertinent information has been shared with the patient in the After Visit Summary.  An After Visit Summary and PPPS were made available to the patient.    Follow Up:   Next Medicare Wellness visit to be scheduled in 1 year.       Additional E&M Note during same encounter follows:  Patient has multiple medical problems which are significant and separately identifiable that require additional work above and beyond the Medicare Wellness Visit.      Chief Complaint  Medicare Wellness-subsequent    Subjective        HPI  Sharon Ahn is also being seen today for diarrhea.  She has struggled with chronic IBS for quite some time however recently has had significant episodes multiple times daily of loose or watery stool.  This is gotten to the point that it is significantly affecting her quality of life and she now has to plan everything around her bowel movements.  She has taken Imodium as well as Bentyl which do improve symptoms however she does not take these consistently due to a few episodes of constipation.    She also feels her underlying anxiety may be worsening her GI symptoms and would like to discuss medication options for treatment of anxiety.    Patient also reports lymphedema is worsening but feels she is having some pitting edema as well.  She has been on a diuretic for this as needed in the past.    Review of Systems   Constitutional:  Negative for chills, fatigue and fever.   Respiratory:  Negative for cough, shortness of breath and wheezing.    Cardiovascular:  Negative for chest pain, palpitations and leg swelling.   Gastrointestinal:  Positive for constipation, diarrhea and nausea. Negative for abdominal pain and vomiting.   Skin:  Negative for rash.   Neurological:  Negative for dizziness and weakness.   Psychiatric/Behavioral:  The patient is nervous/anxious.      Objective  "  Vital Signs:  /88   Pulse 97   Temp 96.8 °F (36 °C) (Temporal)   Ht 162.6 cm (64\")   Wt 83.6 kg (184 lb 6.4 oz)   SpO2 98%   BMI 31.65 kg/m²     Physical Exam  Vitals and nursing note reviewed.   Constitutional:       Appearance: She is obese.      Comments: Chronically ill-appearing   HENT:      Head: Normocephalic and atraumatic.      Right Ear: Tympanic membrane and ear canal normal.      Left Ear: Tympanic membrane and ear canal normal.      Nose: Nose normal.      Mouth/Throat:      Mouth: Mucous membranes are moist.      Pharynx: Oropharynx is clear.   Eyes:      Conjunctiva/sclera: Conjunctivae normal.      Pupils: Pupils are equal, round, and reactive to light.   Cardiovascular:      Rate and Rhythm: Normal rate and regular rhythm.      Heart sounds: Normal heart sounds. No murmur heard.     Comments: Lymphedema bilateral lower extremities below the knee with some trace pitting edema of bilateral feet  Pulmonary:      Effort: Pulmonary effort is normal.      Breath sounds: Normal breath sounds. No wheezing, rhonchi or rales.   Abdominal:      General: Bowel sounds are normal.      Palpations: Abdomen is soft.      Tenderness: There is no abdominal tenderness.   Musculoskeletal:         General: Normal range of motion.      Cervical back: Normal range of motion and neck supple.   Lymphadenopathy:      Cervical: No cervical adenopathy.   Skin:     General: Skin is warm.      Findings: No rash.   Neurological:      General: No focal deficit present.      Mental Status: She is alert and oriented to person, place, and time.      Motor: No weakness.   Psychiatric:         Mood and Affect: Mood is anxious.         Behavior: Behavior normal.                       Assessment and Plan   Diagnoses and all orders for this visit:    1. Encounter for wellness examination in adult (Primary)  Assessment & Plan:  Fasting labs today    Orders:  -     Hemoglobin A1c; Future  -     CBC Auto Differential; Future  - "     Comprehensive Metabolic Panel; Future  -     Lipid Panel; Future  -     TSH; Future  -     Hemoglobin A1c  -     CBC Auto Differential  -     Comprehensive Metabolic Panel  -     Lipid Panel  -     TSH    2. Lymphedema of both lower extremities  Assessment & Plan:  She does have some mild pitting edema of bilateral feet however she was educated about lymphedema and unresponsiveness to diuretics.  I have sent in a short-term supply of Lasix 20 mg for her to use as needed for pitting edema    Orders:  -     furosemide (Lasix) 20 MG tablet; Take 1 tablet by mouth 2 (Two) Times a Day As Needed (swelling).  Dispense: 60 tablet; Refill: 5    3. SREE (generalized anxiety disorder)  Assessment & Plan:  Psychological condition is newly identified.  Rx Zoloft 25 mg daily, side effects discussed including potential for worsening diarrhea  Psychological condition  will be reassessed in 4 weeks.    Orders:  -     sertraline (Zoloft) 25 MG tablet; Take 1 tablet by mouth Daily.  Dispense: 30 tablet; Refill: 5    4. Irritable bowel syndrome with diarrhea  Assessment & Plan:  Imodium and Bentyl to help the patient.  I have instructed her to begin taking these regularly even if it means taking 1 every other day to avoid constipation.    Orders:  -     dicyclomine (BENTYL) 10 MG capsule; Take 1 capsule PO QID AC PRN diarrhea  Dispense: 90 capsule; Refill: 1    5. Type 2 diabetes mellitus without complication, without long-term current use of insulin  Assessment & Plan:  Diabetes is  Stable .   Continue current treatment regimen.  Dietary recommendations for ADA diet.  Regular aerobic exercise.  Diabetes will be reassessed in 6 months.    Orders:  -     Lancets 33G misc; Use 1 each Daily.  Dispense: 100 each; Refill: 12  -     glucose blood test strip; 1 each by Other route Daily. Use as instructed  Dispense: 200 each; Refill: 12  -     metFORMIN (GLUCOPHAGE) 500 MG tablet; Take 2 tablets by mouth 2 (Two) Times a Day With Meals.   Dispense: 360 tablet; Refill: 1  -     POCT microalbumin    6. Vitamin D deficiency  -     Vitamin D,25-Hydroxy; Future  -     Vitamin D,25-Hydroxy    7. B12 deficiency  -     Vitamin B12; Future  -     Vitamin B12    8. Pneumococcal vaccination administered at current visit  -     Pneumococcal Conjugate Vaccine 20-Valent (PCV20)    9. Class 1 obesity due to excess calories with serious comorbidity and body mass index (BMI) of 31.0 to 31.9 in adult  Assessment & Plan:  Patient's (Body mass index is 31.65 kg/m².) indicates that they are obese (BMI >30) with health conditions that include hypertension, diabetes mellitus, dyslipidemias, and osteoarthritis . Weight is unchanged. BMI  is above average; BMI management plan is completed. We discussed portion control and increasing exercise.         Discussed injury prevention, diet and exercise, safe sexual practices, and screening for common diseases. Encouraged use of sunscreen and seatbelts. Encouraged SBE, avoidance of tobacco, limiting alcohol, and yearly dental and eye exams.          Follow Up   Return in about 6 months (around 3/19/2024) for Recheck with labs.  Patient was given instructions and counseling regarding her condition or for health maintenance advice. Please see specific information pulled into the AVS if appropriate.

## 2023-09-20 LAB
25(OH)D3+25(OH)D2 SERPL-MCNC: 35.1 NG/ML (ref 30–100)
ALBUMIN SERPL-MCNC: 4.2 G/DL (ref 3.8–4.8)
ALBUMIN/GLOB SERPL: 1.8 {RATIO} (ref 1.2–2.2)
ALP SERPL-CCNC: 74 IU/L (ref 44–121)
ALT SERPL-CCNC: 12 IU/L (ref 0–32)
AST SERPL-CCNC: 19 IU/L (ref 0–40)
BASOPHILS # BLD AUTO: 0.1 X10E3/UL (ref 0–0.2)
BASOPHILS NFR BLD AUTO: 1 %
BILIRUB SERPL-MCNC: 0.4 MG/DL (ref 0–1.2)
BUN SERPL-MCNC: 18 MG/DL (ref 8–27)
BUN/CREAT SERPL: 19 (ref 12–28)
CALCIUM SERPL-MCNC: 10.3 MG/DL (ref 8.7–10.3)
CHLORIDE SERPL-SCNC: 105 MMOL/L (ref 96–106)
CHOLEST SERPL-MCNC: 157 MG/DL (ref 100–199)
CO2 SERPL-SCNC: 18 MMOL/L (ref 20–29)
CREAT SERPL-MCNC: 0.94 MG/DL (ref 0.57–1)
EGFRCR SERPLBLD CKD-EPI 2021: 62 ML/MIN/1.73
EOSINOPHIL # BLD AUTO: 0.4 X10E3/UL (ref 0–0.4)
EOSINOPHIL NFR BLD AUTO: 6 %
ERYTHROCYTE [DISTWIDTH] IN BLOOD BY AUTOMATED COUNT: 13 % (ref 11.7–15.4)
GLOBULIN SER CALC-MCNC: 2.3 G/DL (ref 1.5–4.5)
GLUCOSE SERPL-MCNC: 123 MG/DL (ref 70–99)
HBA1C MFR BLD: 6 % (ref 4.8–5.6)
HCT VFR BLD AUTO: 40.4 % (ref 34–46.6)
HDLC SERPL-MCNC: 72 MG/DL
HGB BLD-MCNC: 13.4 G/DL (ref 11.1–15.9)
IMM GRANULOCYTES # BLD AUTO: 0 X10E3/UL (ref 0–0.1)
IMM GRANULOCYTES NFR BLD AUTO: 0 %
LDLC SERPL CALC-MCNC: 59 MG/DL (ref 0–99)
LYMPHOCYTES # BLD AUTO: 2.7 X10E3/UL (ref 0.7–3.1)
LYMPHOCYTES NFR BLD AUTO: 41 %
MCH RBC QN AUTO: 31.8 PG (ref 26.6–33)
MCHC RBC AUTO-ENTMCNC: 33.2 G/DL (ref 31.5–35.7)
MCV RBC AUTO: 96 FL (ref 79–97)
MONOCYTES # BLD AUTO: 0.4 X10E3/UL (ref 0.1–0.9)
MONOCYTES NFR BLD AUTO: 6 %
NEUTROPHILS # BLD AUTO: 3.1 X10E3/UL (ref 1.4–7)
NEUTROPHILS NFR BLD AUTO: 46 %
PLATELET # BLD AUTO: 174 X10E3/UL (ref 150–450)
POTASSIUM SERPL-SCNC: 4 MMOL/L (ref 3.5–5.2)
PROT SERPL-MCNC: 6.5 G/DL (ref 6–8.5)
RBC # BLD AUTO: 4.21 X10E6/UL (ref 3.77–5.28)
SODIUM SERPL-SCNC: 141 MMOL/L (ref 134–144)
TRIGL SERPL-MCNC: 158 MG/DL (ref 0–149)
TSH SERPL DL<=0.005 MIU/L-ACNC: 1.78 UIU/ML (ref 0.45–4.5)
VIT B12 SERPL-MCNC: 707 PG/ML (ref 232–1245)
VLDLC SERPL CALC-MCNC: 26 MG/DL (ref 5–40)
WBC # BLD AUTO: 6.6 X10E3/UL (ref 3.4–10.8)

## 2023-10-30 DIAGNOSIS — E03.9 ACQUIRED HYPOTHYROIDISM: ICD-10-CM

## 2023-10-30 RX ORDER — LEVOTHYROXINE SODIUM 0.07 MG/1
75 TABLET ORAL
Qty: 90 TABLET | Refills: 1 | Status: SHIPPED | OUTPATIENT
Start: 2023-10-30

## 2023-11-20 DIAGNOSIS — E03.9 ACQUIRED HYPOTHYROIDISM: ICD-10-CM

## 2023-11-20 RX ORDER — LEVOTHYROXINE SODIUM 0.05 MG/1
50 TABLET ORAL
Qty: 90 TABLET | Refills: 1 | Status: SHIPPED | OUTPATIENT
Start: 2023-11-20

## 2023-12-06 RX ORDER — FEBUXOSTAT 40 MG/1
TABLET, FILM COATED ORAL
Qty: 30 TABLET | Refills: 3 | Status: SHIPPED | OUTPATIENT
Start: 2023-12-06

## 2024-02-05 RX ORDER — SPIRONOLACTONE 25 MG/1
25 TABLET ORAL DAILY
Qty: 90 TABLET | Refills: 1 | Status: SHIPPED | OUTPATIENT
Start: 2024-02-05

## 2024-03-18 RX ORDER — TOPIRAMATE 100 MG/1
TABLET, FILM COATED ORAL
Qty: 180 TABLET | Refills: 1 | Status: SHIPPED | OUTPATIENT
Start: 2024-03-18

## 2024-04-05 DIAGNOSIS — E11.9 TYPE 2 DIABETES MELLITUS WITHOUT COMPLICATION, WITHOUT LONG-TERM CURRENT USE OF INSULIN: ICD-10-CM

## 2024-04-05 DIAGNOSIS — F41.1 GAD (GENERALIZED ANXIETY DISORDER): ICD-10-CM

## 2024-04-05 RX ORDER — SERTRALINE HYDROCHLORIDE 25 MG/1
25 TABLET, FILM COATED ORAL DAILY
Qty: 90 TABLET | Refills: 1 | Status: SHIPPED | OUTPATIENT
Start: 2024-04-05

## 2024-06-26 RX ORDER — FEBUXOSTAT 40 MG/1
TABLET, FILM COATED ORAL
Qty: 90 TABLET | Refills: 1 | Status: SHIPPED | OUTPATIENT
Start: 2024-06-26

## 2024-07-28 DIAGNOSIS — E03.9 ACQUIRED HYPOTHYROIDISM: ICD-10-CM

## 2024-07-29 RX ORDER — LEVOTHYROXINE SODIUM 0.07 MG/1
75 TABLET ORAL
Qty: 90 TABLET | Refills: 1 | Status: SHIPPED | OUTPATIENT
Start: 2024-07-29

## 2024-07-29 RX ORDER — SPIRONOLACTONE 25 MG/1
25 TABLET ORAL DAILY
Qty: 90 TABLET | Refills: 1 | Status: SHIPPED | OUTPATIENT
Start: 2024-07-29

## 2024-08-14 RX ORDER — LOSARTAN POTASSIUM 100 MG/1
TABLET ORAL
Qty: 90 TABLET | Refills: 11 | Status: SHIPPED | OUTPATIENT
Start: 2024-08-14

## 2024-08-19 ENCOUNTER — OFFICE VISIT (OUTPATIENT)
Dept: FAMILY MEDICINE CLINIC | Facility: CLINIC | Age: 79
End: 2024-08-19
Payer: MEDICARE

## 2024-08-19 VITALS
HEIGHT: 64 IN | DIASTOLIC BLOOD PRESSURE: 86 MMHG | BODY MASS INDEX: 29.96 KG/M2 | WEIGHT: 175.5 LBS | OXYGEN SATURATION: 98 % | HEART RATE: 111 BPM | SYSTOLIC BLOOD PRESSURE: 134 MMHG

## 2024-08-19 DIAGNOSIS — E11.9 TYPE 2 DIABETES MELLITUS WITHOUT COMPLICATION, WITHOUT LONG-TERM CURRENT USE OF INSULIN: Primary | ICD-10-CM

## 2024-08-19 DIAGNOSIS — I10 BENIGN ESSENTIAL HYPERTENSION: ICD-10-CM

## 2024-08-19 DIAGNOSIS — E03.9 ACQUIRED HYPOTHYROIDISM: ICD-10-CM

## 2024-08-19 DIAGNOSIS — E55.9 VITAMIN D DEFICIENCY: ICD-10-CM

## 2024-08-19 DIAGNOSIS — M25.551 RIGHT HIP PAIN: ICD-10-CM

## 2024-08-19 DIAGNOSIS — L57.0 ACTINIC KERATOSIS OF RIGHT CHEEK: ICD-10-CM

## 2024-08-19 DIAGNOSIS — G43.709 CHRONIC MIGRAINE WITHOUT AURA WITHOUT STATUS MIGRAINOSUS, NOT INTRACTABLE: ICD-10-CM

## 2024-08-19 DIAGNOSIS — M54.50 CHRONIC RIGHT-SIDED LOW BACK PAIN WITHOUT SCIATICA: ICD-10-CM

## 2024-08-19 DIAGNOSIS — G89.29 CHRONIC RIGHT-SIDED LOW BACK PAIN WITHOUT SCIATICA: ICD-10-CM

## 2024-08-19 DIAGNOSIS — K58.0 IRRITABLE BOWEL SYNDROME WITH DIARRHEA: ICD-10-CM

## 2024-08-19 LAB
EXPIRATION DATE: NORMAL
HBA1C MFR BLD: 5.6 % (ref 4.5–5.7)
Lab: NORMAL

## 2024-08-19 PROCEDURE — 99214 OFFICE O/P EST MOD 30 MIN: CPT | Performed by: FAMILY MEDICINE

## 2024-08-19 PROCEDURE — 3044F HG A1C LEVEL LT 7.0%: CPT | Performed by: FAMILY MEDICINE

## 2024-08-19 PROCEDURE — 3075F SYST BP GE 130 - 139MM HG: CPT | Performed by: FAMILY MEDICINE

## 2024-08-19 PROCEDURE — 1159F MED LIST DOCD IN RCRD: CPT | Performed by: FAMILY MEDICINE

## 2024-08-19 PROCEDURE — 83036 HEMOGLOBIN GLYCOSYLATED A1C: CPT | Performed by: FAMILY MEDICINE

## 2024-08-19 PROCEDURE — 36415 COLL VENOUS BLD VENIPUNCTURE: CPT | Performed by: FAMILY MEDICINE

## 2024-08-19 PROCEDURE — 1160F RVW MEDS BY RX/DR IN RCRD: CPT | Performed by: FAMILY MEDICINE

## 2024-08-19 PROCEDURE — 3079F DIAST BP 80-89 MM HG: CPT | Performed by: FAMILY MEDICINE

## 2024-08-19 RX ORDER — BLOOD-GLUCOSE METER
1 EACH MISCELLANEOUS DAILY
COMMUNITY
End: 2024-08-19 | Stop reason: SDUPTHER

## 2024-08-19 RX ORDER — LANCETS 33 GAUGE
1 EACH MISCELLANEOUS DAILY
Qty: 100 EACH | Refills: 12 | Status: SHIPPED | OUTPATIENT
Start: 2024-08-19

## 2024-08-19 RX ORDER — BLOOD-GLUCOSE METER
1 EACH MISCELLANEOUS DAILY
Qty: 1 EACH | Refills: 0 | Status: SHIPPED | OUTPATIENT
Start: 2024-08-19 | End: 2024-08-19

## 2024-08-19 RX ORDER — ATOGEPANT 60 MG/1
60 TABLET ORAL DAILY
Qty: 30 TABLET | Refills: 5 | Status: SHIPPED | OUTPATIENT
Start: 2024-08-19

## 2024-08-19 RX ORDER — DICYCLOMINE HYDROCHLORIDE 10 MG/1
CAPSULE ORAL
Qty: 90 CAPSULE | Refills: 1 | Status: SHIPPED | OUTPATIENT
Start: 2024-08-19

## 2024-08-19 NOTE — PROGRESS NOTES
Date: 2024   Patient Name: Sharon Ahn  : 1945   MRN: [id number]     Chief Complaint:    Chief Complaint   Patient presents with    Hypertension    Diabetes    Headache    Leg Pain     Radiates down right leg and in right knee     Nausea       History of Present Illness               Review of Systems:   Review of Systems    Past Medical History:   Past Medical History:   Diagnosis Date    Benign essential HTN     Chest pain     NO SIG CZ BY IVUS    Chronic edema     Diverticulosis     FIBER    DJD (degenerative joint disease)     DM (diabetes mellitus)     GERD (gastroesophageal reflux disease)     Gout     Hiatal hernia     HLP (hyperkeratosis lenticularis perstans)     Hypothyroidism     Lymphedema of lower extremity     Migraine     Seizure disorder     Sleep apnea     UTI (urinary tract infection)        Past Surgical History:   Past Surgical History:   Procedure Laterality Date    APPENDECTOMY      INCIDENTAL    CHOLECYSTECTOMY      OPEN    HERNIA REPAIR      REPAIR W/DONUT  DONUT REMOVAL  2019 [facility]    HYSTERECTOMY      LIVER BIOPSY         Family History:   Family History   Problem Relation Age of Onset    Alzheimer's disease Mother     Heart attack Father     Stroke Sister     Other Brother         GLIOBLASTOMA       Social History:   Social History     Socioeconomic History    Marital status:      Spouse name: Familia    Number of children: 0   Tobacco Use    Smoking status: Never    Smokeless tobacco: Never   Vaping Use    Vaping status: Never Used   Substance and Sexual Activity    Alcohol use: Never    Drug use: Never    Sexual activity: Not Currently       Medications:     Current Outpatient Medications:     dicyclomine (BENTYL) 10 MG capsule, Take 1 capsule PO QID AC PRN diarrhea, Disp: 90 capsule, Rfl: 1    febuxostat (ULORIC) 40 MG tablet, TAKE ONE TABLET BY MOUTH DAILY TO PREVENT GOUT, Disp: 90 tablet, Rfl: 1    furosemide (Lasix) 20 MG tablet, Take 1 tablet by  mouth 2 (Two) Times a Day As Needed (swelling)., Disp: 60 tablet, Rfl: 5    glucose blood test strip, 1 each by Other route Daily. Use as instructed, Disp: 200 each, Rfl: 12    Hydrocortisone, Perianal, (Anusol-HC) 2.5 % rectal cream, Insert  into the rectum 2 (Two) Times a Day., Disp: 28 g, Rfl: 5    Lancets 33G misc, Use 1 each Daily., Disp: 100 each, Rfl: 12    levothyroxine (SYNTHROID, LEVOTHROID) 50 MCG tablet, TAKE 1 TABLET BY MOUTH EVERY MORNING., Disp: 90 tablet, Rfl: 1    levothyroxine (SYNTHROID, LEVOTHROID) 75 MCG tablet, TAKE 1 TABLET BY MOUTH EVERY MORNING., Disp: 90 tablet, Rfl: 1    losartan (COZAAR) 100 MG tablet, TAKE ONE TABLET BY MOUTH DAILY, Disp: 90 tablet, Rfl: 11    metFORMIN (GLUCOPHAGE) 500 MG tablet, TAKE 2 TABLETS BY MOUTH 2 (TWO) TIMES A DAY WITH MEALS., Disp: 360 tablet, Rfl: 1    multivitamin with minerals tablet tablet, Take 1 tablet by mouth Daily., Disp: , Rfl:     nystatin (MYCOSTATIN) 080389 UNIT/GM powder, Apply  topically to the appropriate area as directed 3 (Three) Times a Day., Disp: 60 g, Rfl: 5    sertraline (ZOLOFT) 25 MG tablet, TAKE 1 TABLET BY MOUTH DAILY., Disp: 90 tablet, Rfl: 1    spironolactone (ALDACTONE) 25 MG tablet, TAKE 1 TABLET BY MOUTH DAILY., Disp: 90 tablet, Rfl: 1    topiramate (TOPAMAX) 100 MG tablet, TAKE 1 TABLET BY MOUTH EVERY MORNING AND TAKE 1 TABLET BY MOUTH EVERY EVENING., Disp: 180 tablet, Rfl: 1    Atogepant (Qulipta) 60 MG tablet, Take 1 tablet by mouth Daily., Disp: 30 tablet, Rfl: 5    Blood Glucose Monitoring Suppl (D-Care Glucometer) w/Device kit, Use 1 each 1 (One) Time for 1 dose. Check blood sugars once daily, Disp: 1 each, Rfl: 0    Allergies:   Allergies   Allergen Reactions    Amoxicillin Rash    Codeine GI Intolerance       PHQ-2 Total Score: 0   PHQ-9 Total Score: 0     Physical Exam:  Vital Signs:   Vitals:    08/19/24 0839   BP: 134/86   BP Location: Left arm   Patient Position: Sitting   Cuff Size: Adult   Pulse: 111   SpO2: 98%  "  Weight: 79.6 kg (175 lb 8 oz)   Height: 162.6 cm (64\")     Body mass index is 30.12 kg/m².     Physical Exam      Assessment/Plan:   Diagnoses and all orders for this visit:    1. Type 2 diabetes mellitus without complication, without long-term current use of insulin (Primary)  -     glucose blood test strip; 1 each by Other route Daily. Use as instructed  Dispense: 200 each; Refill: 12  -     Lancets 33G misc; Use 1 each Daily.  Dispense: 100 each; Refill: 12  -     Lipid Panel; Future  -     POC Glycosylated Hemoglobin (Hb A1C)  -     Lipid Panel  -     Blood Glucose Monitoring Suppl (D-Care Glucometer) w/Device kit; Use 1 each 1 (One) Time for 1 dose. Check blood sugars once daily  Dispense: 1 each; Refill: 0    2. Chronic migraine without aura without status migrainosus, not intractable  -     Atogepant (Qulipta) 60 MG tablet; Take 1 tablet by mouth Daily.  Dispense: 30 tablet; Refill: 5    3. Benign essential hypertension  -     CBC Auto Differential; Future  -     Comprehensive Metabolic Panel; Future  -     CBC Auto Differential  -     Comprehensive Metabolic Panel    4. Acquired hypothyroidism  -     TSH; Future  -     T4, Free; Future  -     TSH  -     T4, Free    5. Irritable bowel syndrome with diarrhea  -     dicyclomine (BENTYL) 10 MG capsule; Take 1 capsule PO QID AC PRN diarrhea  Dispense: 90 capsule; Refill: 1    6. Vitamin D deficiency    7. Actinic keratosis of right cheek  -     Ambulatory Referral to Dermatology    8. Right hip pain  -     XR Hip With or Without Pelvis 2 - 3 View Right; Future    9. Chronic right-sided low back pain without sciatica  -     XR Spine Lumbar 2 or 3 View; Future    Other orders  -     Discontinue: Blood Glucose Monitoring Suppl (True Metrix Air Glucose Meter) device; Use 1 each Daily.  Dispense: 1 each; Refill: 0         Assessment & Plan        Follow Up:   No follow-ups on file.        Sissy Moody,   [facility]    "

## 2024-08-20 LAB
ALBUMIN SERPL-MCNC: 4.2 G/DL (ref 3.8–4.8)
ALP SERPL-CCNC: 72 IU/L (ref 44–121)
ALT SERPL-CCNC: 12 IU/L (ref 0–32)
AST SERPL-CCNC: 16 IU/L (ref 0–40)
BASOPHILS # BLD AUTO: 0.1 X10E3/UL (ref 0–0.2)
BASOPHILS NFR BLD AUTO: 1 %
BILIRUB SERPL-MCNC: 0.3 MG/DL (ref 0–1.2)
BUN SERPL-MCNC: 15 MG/DL (ref 8–27)
BUN/CREAT SERPL: 15 (ref 12–28)
CALCIUM SERPL-MCNC: 9.7 MG/DL (ref 8.7–10.3)
CHLORIDE SERPL-SCNC: 111 MMOL/L (ref 96–106)
CHOLEST SERPL-MCNC: 149 MG/DL (ref 100–199)
CO2 SERPL-SCNC: 17 MMOL/L (ref 20–29)
CREAT SERPL-MCNC: 0.99 MG/DL (ref 0.57–1)
EGFRCR SERPLBLD CKD-EPI 2021: 58 ML/MIN/1.73
EOSINOPHIL # BLD AUTO: 0.4 X10E3/UL (ref 0–0.4)
EOSINOPHIL NFR BLD AUTO: 7 %
ERYTHROCYTE [DISTWIDTH] IN BLOOD BY AUTOMATED COUNT: 12.6 % (ref 11.7–15.4)
GLOBULIN SER CALC-MCNC: 2.2 G/DL (ref 1.5–4.5)
GLUCOSE SERPL-MCNC: 141 MG/DL (ref 70–99)
HCT VFR BLD AUTO: 42.4 % (ref 34–46.6)
HDLC SERPL-MCNC: 72 MG/DL
HGB BLD-MCNC: 13.7 G/DL (ref 11.1–15.9)
IMM GRANULOCYTES # BLD AUTO: 0 X10E3/UL (ref 0–0.1)
IMM GRANULOCYTES NFR BLD AUTO: 0 %
LDLC SERPL CALC-MCNC: 55 MG/DL (ref 0–99)
LYMPHOCYTES # BLD AUTO: 2.2 X10E3/UL (ref 0.7–3.1)
LYMPHOCYTES NFR BLD AUTO: 37 %
MCH RBC QN AUTO: 32 PG (ref 26.6–33)
MCHC RBC AUTO-ENTMCNC: 32.3 G/DL (ref 31.5–35.7)
MCV RBC AUTO: 99 FL (ref 79–97)
MONOCYTES # BLD AUTO: 0.4 X10E3/UL (ref 0.1–0.9)
MONOCYTES NFR BLD AUTO: 7 %
NEUTROPHILS # BLD AUTO: 2.8 X10E3/UL (ref 1.4–7)
NEUTROPHILS NFR BLD AUTO: 48 %
PLATELET # BLD AUTO: 184 X10E3/UL (ref 150–450)
POTASSIUM SERPL-SCNC: 3.6 MMOL/L (ref 3.5–5.2)
PROT SERPL-MCNC: 6.4 G/DL (ref 6–8.5)
RBC # BLD AUTO: 4.28 X10E6/UL (ref 3.77–5.28)
SODIUM SERPL-SCNC: 144 MMOL/L (ref 134–144)
T4 FREE SERPL-MCNC: 1.3 NG/DL (ref 0.82–1.77)
TRIGL SERPL-MCNC: 129 MG/DL (ref 0–149)
TSH SERPL DL<=0.005 MIU/L-ACNC: 1.14 UIU/ML (ref 0.45–4.5)
VLDLC SERPL CALC-MCNC: 22 MG/DL (ref 5–40)
WBC # BLD AUTO: 5.9 X10E3/UL (ref 3.4–10.8)

## 2024-08-22 ENCOUNTER — TELEPHONE (OUTPATIENT)
Dept: FAMILY MEDICINE CLINIC | Facility: CLINIC | Age: 79
End: 2024-08-22
Payer: MEDICARE

## 2024-08-25 DIAGNOSIS — E03.9 ACQUIRED HYPOTHYROIDISM: ICD-10-CM

## 2024-08-26 RX ORDER — LEVOTHYROXINE SODIUM 50 UG/1
50 TABLET ORAL
Qty: 90 TABLET | Refills: 1 | Status: SHIPPED | OUTPATIENT
Start: 2024-08-26

## 2024-09-23 DIAGNOSIS — F41.1 GAD (GENERALIZED ANXIETY DISORDER): ICD-10-CM

## 2024-09-23 RX ORDER — TOPIRAMATE 100 MG/1
TABLET, FILM COATED ORAL
Qty: 180 TABLET | Refills: 1 | Status: SHIPPED | OUTPATIENT
Start: 2024-09-23

## 2024-09-23 RX ORDER — SERTRALINE HYDROCHLORIDE 25 MG/1
25 TABLET, FILM COATED ORAL DAILY
Qty: 90 TABLET | Refills: 1 | Status: SHIPPED | OUTPATIENT
Start: 2024-09-23

## 2024-10-04 DIAGNOSIS — K58.0 IRRITABLE BOWEL SYNDROME WITH DIARRHEA: ICD-10-CM

## 2024-10-04 RX ORDER — DICYCLOMINE HYDROCHLORIDE 10 MG/1
CAPSULE ORAL
Qty: 90 CAPSULE | Refills: 1 | Status: SHIPPED | OUTPATIENT
Start: 2024-10-04

## 2024-10-13 DIAGNOSIS — E11.9 TYPE 2 DIABETES MELLITUS WITHOUT COMPLICATION, WITHOUT LONG-TERM CURRENT USE OF INSULIN: ICD-10-CM

## 2025-02-18 DIAGNOSIS — G43.709 CHRONIC MIGRAINE WITHOUT AURA WITHOUT STATUS MIGRAINOSUS, NOT INTRACTABLE: ICD-10-CM

## 2025-02-18 RX ORDER — ATOGEPANT 60 MG/1
60 TABLET ORAL DAILY
Qty: 30 TABLET | Refills: 5 | Status: SHIPPED | OUTPATIENT
Start: 2025-02-18

## 2025-02-18 NOTE — TELEPHONE ENCOUNTER
Requested Prescriptions:   Requested Prescriptions     Pending Prescriptions Disp Refills    Atogepant (Qulipta) 60 MG tablet 30 tablet 5     Sig: Take 1 tablet by mouth Daily.        Pharmacy where request should be sent: [facility] - [address] [phone] [facility] [phone] [facility]     Last office visit with prescribing clinician: Visit date not found   Last telemedicine visit with prescribing clinician: Visit date not found   Next office visit with prescribing clinician: Visit date not found     Aida Casanova Rep   02/18/25 09:19 EST

## 2025-05-01 ENCOUNTER — TELEPHONE (OUTPATIENT)
Dept: FAMILY MEDICINE CLINIC | Facility: CLINIC | Age: 80
End: 2025-05-01
Payer: MEDICARE

## 2025-05-01 DIAGNOSIS — F41.1 GAD (GENERALIZED ANXIETY DISORDER): ICD-10-CM

## 2025-05-01 DIAGNOSIS — E03.9 ACQUIRED HYPOTHYROIDISM: ICD-10-CM

## 2025-05-01 RX ORDER — LEVOTHYROXINE SODIUM 50 UG/1
50 TABLET ORAL
Qty: 90 TABLET | Refills: 0 | Status: SHIPPED | OUTPATIENT
Start: 2025-05-01

## 2025-05-01 RX ORDER — SERTRALINE HYDROCHLORIDE 25 MG/1
25 TABLET, FILM COATED ORAL DAILY
Qty: 90 TABLET | Refills: 1 | Status: SHIPPED | OUTPATIENT
Start: 2025-05-01

## 2025-05-01 RX ORDER — LEVOTHYROXINE SODIUM 75 UG/1
75 TABLET ORAL
Qty: 90 TABLET | Refills: 0 | Status: SHIPPED | OUTPATIENT
Start: 2025-05-01

## 2025-05-01 RX ORDER — SPIRONOLACTONE 25 MG/1
25 TABLET ORAL DAILY
Qty: 90 TABLET | Refills: 0 | Status: SHIPPED | OUTPATIENT
Start: 2025-05-01

## 2025-05-01 NOTE — TELEPHONE ENCOUNTER
Sharon wrote me a personal letter to the office here stating that she will be transitioning to [facility] as a new patient May 9 but is going to run out of her medicines prior to then she was formally seen by Dr. Jorge villalta and would like her medicine sent in before that she needs levothyroxine 50 levothyroxine 75  Spironolactone 25  Sertraline 25  Sent to [facility]

## 2025-07-11 ENCOUNTER — OFFICE VISIT (OUTPATIENT)
Dept: FAMILY MEDICINE CLINIC | Facility: CLINIC | Age: 80
End: 2025-07-11
Payer: MEDICARE

## 2025-07-11 VITALS
WEIGHT: 157.3 LBS | HEART RATE: 83 BPM | RESPIRATION RATE: 16 BRPM | BODY MASS INDEX: 26.85 KG/M2 | TEMPERATURE: 98.3 F | SYSTOLIC BLOOD PRESSURE: 128 MMHG | DIASTOLIC BLOOD PRESSURE: 68 MMHG | OXYGEN SATURATION: 99 % | HEIGHT: 64 IN

## 2025-07-11 DIAGNOSIS — R29.6 FALLS FREQUENTLY: ICD-10-CM

## 2025-07-11 DIAGNOSIS — Z00.00 ANNUAL PHYSICAL EXAM: Primary | ICD-10-CM

## 2025-07-11 DIAGNOSIS — Z13.29 SCREENING FOR THYROID DISORDER: ICD-10-CM

## 2025-07-11 DIAGNOSIS — E11.9 TYPE 2 DIABETES MELLITUS WITHOUT COMPLICATION, WITHOUT LONG-TERM CURRENT USE OF INSULIN: ICD-10-CM

## 2025-07-11 DIAGNOSIS — Z78.0 POSTMENOPAUSE: ICD-10-CM

## 2025-07-11 DIAGNOSIS — Z13.1 SCREENING FOR DIABETES MELLITUS (DM): ICD-10-CM

## 2025-07-11 RX ORDER — FEBUXOSTAT 40 MG/1
40 TABLET, FILM COATED ORAL DAILY
Qty: 90 TABLET | Refills: 1 | Status: SHIPPED | OUTPATIENT
Start: 2025-07-11

## 2025-07-11 RX ORDER — LOSARTAN POTASSIUM 100 MG/1
100 TABLET ORAL DAILY
Qty: 90 TABLET | Refills: 1 | Status: SHIPPED | OUTPATIENT
Start: 2025-07-11

## 2025-07-11 RX ORDER — NYSTATIN 100000 [USP'U]/G
POWDER TOPICAL 3 TIMES DAILY
Qty: 60 G | Refills: 2 | Status: SHIPPED | OUTPATIENT
Start: 2025-07-11

## 2025-07-11 RX ORDER — SPIRONOLACTONE 25 MG/1
25 TABLET ORAL DAILY
Qty: 90 TABLET | Refills: 0 | Status: SHIPPED | OUTPATIENT
Start: 2025-07-11

## 2025-07-11 NOTE — PATIENT INSTRUCTIONS
Health Maintenance, Female  Adopting a healthy lifestyle and getting preventive care can go a long way to promote health and wellness. Talk with your health care provider about what schedule of regular examinations is right for you. This is a good chance for you to check in with your provider about disease prevention and staying healthy.  In between checkups, there are plenty of things you can do on your own. Experts have done a lot of research about which lifestyle changes and preventive measures are most likely to keep you healthy. Ask your health care provider for more information.  Weight and diet  Eat a healthy diet  Be sure to include plenty of vegetables, fruits, low-fat dairy products, and lean protein.  Do not eat a lot of foods high in solid fats, added sugars, or salt.  Get regular exercise. This is one of the most important things you can do for your health.  Most adults should exercise for at least 150 minutes each week. The exercise should increase your heart rate and make you sweat (moderate-intensity exercise).  Most adults should also do strengthening exercises at least twice a week. This is in addition to the moderate-intensity exercise.     Maintain a healthy weight  Body mass index (BMI) is a measurement that can be used to identify possible weight problems. It estimates body fat based on height and weight. Your health care provider can help determine your BMI and help you achieve or maintain a healthy weight.  For females 20 years of age and older:  A BMI below 18.5 is considered underweight.  A BMI of 18.5 to 24.9 is normal.  A BMI of 25 to 29.9 is considered overweight.  A BMI of 30 and above is considered obese.     Watch levels of cholesterol and blood lipids  You should start having your blood tested for lipids and cholesterol at 20 years of age, then have this test every 5 years.  You may need to have your cholesterol levels checked more often if:  Your lipid or cholesterol levels are  high.  You are older than 50 years of age.  You are at high risk for heart disease.     Cancer screening  Lung Cancer  Lung cancer screening is recommended for adults 55-80 years old who are at high risk for lung cancer because of a history of smoking.  A yearly low-dose CT scan of the lungs is recommended for people who:  Currently smoke.  Have quit within the past 15 years.  Have at least a 30-pack-year history of smoking. A pack year is smoking an average of one pack of cigarettes a day for 1 year.  Yearly screening should continue until it has been 15 years since you quit.  Yearly screening should stop if you develop a health problem that would prevent you from having lung cancer treatment.     Breast Cancer  Practice breast self-awareness. This means understanding how your breasts normally appear and feel.  It also means doing regular breast self-exams. Let your health care provider know about any changes, no matter how small.  If you are in your 20s or 30s, you should have a clinical breast exam (CBE) by a health care provider every 1-3 years as part of a regular health exam.  If you are 40 or older, have a CBE every year. Also consider having a breast X-ray (mammogram) every year.  If you have a family history of breast cancer, talk to your health care provider about genetic screening.  If you are at high risk for breast cancer, talk to your health care provider about having an MRI and a mammogram every year.  Breast cancer gene (BRCA) assessment is recommended for women who have family members with BRCA-related cancers. BRCA-related cancers include:  Breast.  Ovarian.  Tubal.  Peritoneal cancers.  Results of the assessment will determine the need for genetic counseling and BRCA1 and BRCA2 testing.     Cervical Cancer  Your health care provider may recommend that you be screened regularly for cancer of the pelvic organs (ovaries, uterus, and vagina). This screening involves a pelvic examination, including  checking for microscopic changes to the surface of your cervix (Pap test). You may be encouraged to have this screening done every 3 years, beginning at age 21.  For women ages 30-65, health care providers may recommend pelvic exams and Pap testing every 3 years, or they may recommend the Pap and pelvic exam, combined with testing for human papilloma virus (HPV), every 5 years. Some types of HPV increase your risk of cervical cancer. Testing for HPV may also be done on women of any age with unclear Pap test results.  Other health care providers may not recommend any screening for nonpregnant women who are considered low risk for pelvic cancer and who do not have symptoms. Ask your health care provider if a screening pelvic exam is right for you.  If you have had past treatment for cervical cancer or a condition that could lead to cancer, you need Pap tests and screening for cancer for at least 20 years after your treatment. If Pap tests have been discontinued, your risk factors (such as having a new sexual partner) need to be reassessed to determine if screening should resume. Some women have medical problems that increase the chance of getting cervical cancer. In these cases, your health care provider may recommend more frequent screening and Pap tests.     Colorectal Cancer  This type of cancer can be detected and often prevented.  Routine colorectal cancer screening usually begins at 50 years of age and continues through 75 years of age.  Your health care provider may recommend screening at an earlier age if you have risk factors for colon cancer.  Your health care provider may also recommend using home test kits to check for hidden blood in the stool.  A small camera at the end of a tube can be used to examine your colon directly (sigmoidoscopy or colonoscopy). This is done to check for the earliest forms of colorectal cancer.  Routine screening usually begins at age 50.  Direct examination of the colon should  be repeated every 5-10 years through 75 years of age. However, you may need to be screened more often if early forms of precancerous polyps or small growths are found.     Skin Cancer  Check your skin from head to toe regularly.  Tell your health care provider about any new moles or changes in moles, especially if there is a change in a mole's shape or color.  Also tell your health care provider if you have a mole that is larger than the size of a pencil eraser.  Always use sunscreen. Apply sunscreen liberally and repeatedly throughout the day.  Protect yourself by wearing long sleeves, pants, a wide-brimmed hat, and sunglasses whenever you are outside.     Heart disease, diabetes, and high blood pressure  High blood pressure causes heart disease and increases the risk of stroke. High blood pressure is more likely to develop in:  People who have blood pressure in the high end of the normal range (130-139/85-89 mm Hg).  People who are overweight or obese.  People who are .  If you are 18-39 years of age, have your blood pressure checked every 3-5 years. If you are 40 years of age or older, have your blood pressure checked every year. You should have your blood pressure measured twice--once when you are at a hospital or clinic, and once when you are not at a hospital or clinic. Record the average of the two measurements. To check your blood pressure when you are not at a hospital or clinic, you can use:  An automated blood pressure machine at a pharmacy.  A home blood pressure monitor.  If you are between 55 years and 79 years old, ask your health care provider if you should take aspirin to prevent strokes.  Have regular diabetes screenings. This involves taking a blood sample to check your fasting blood sugar level.  If you are at a normal weight and have a low risk for diabetes, have this test once every three years after 45 years of age.  If you are overweight and have a high risk for diabetes,  consider being tested at a younger age or more often.  Preventing infection  Hepatitis B  If you have a higher risk for hepatitis B, you should be screened for this virus. You are considered at high risk for hepatitis B if:  You were born in a country where hepatitis B is common. Ask your health care provider which countries are considered high risk.  Your parents were born in a high-risk country, and you have not been immunized against hepatitis B (hepatitis B vaccine).  You have HIV or AIDS.  You use needles to inject street drugs.  You live with someone who has hepatitis B.  You have had sex with someone who has hepatitis B.  You get hemodialysis treatment.  You take certain medicines for conditions, including cancer, organ transplantation, and autoimmune conditions.     Hepatitis C  Blood testing is recommended for:  Everyone born from 1945 through 1965.  Anyone with known risk factors for hepatitis C.     Sexually transmitted infections (STIs)  You should be screened for sexually transmitted infections (STIs) including gonorrhea and chlamydia if:  You are sexually active and are younger than 24 years of age.  You are older than 24 years of age and your health care provider tells you that you are at risk for this type of infection.  Your sexual activity has changed since you were last screened and you are at an increased risk for chlamydia or gonorrhea. Ask your health care provider if you are at risk.  If you do not have HIV, but are at risk, it may be recommended that you take a prescription medicine daily to prevent HIV infection. This is called pre-exposure prophylaxis (PrEP). You are considered at risk if:  You are sexually active and do not regularly use condoms or know the HIV status of your partner(s).  You take drugs by injection.  You are sexually active with a partner who has HIV.     Talk with your health care provider about whether you are at high risk of being infected with HIV. If you choose to  begin PrEP, you should first be tested for HIV. You should then be tested every 3 months for as long as you are taking PrEP.  Pregnancy  If you are premenopausal and you may become pregnant, ask your health care provider about preconception counseling.  If you may become pregnant, take 400 to 800 micrograms (mcg) of folic acid every day.  If you want to prevent pregnancy, talk to your health care provider about birth control (contraception).  Osteoporosis and menopause  Osteoporosis is a disease in which the bones lose minerals and strength with aging. This can result in serious bone fractures. Your risk for osteoporosis can be identified using a bone density scan.  If you are 65 years of age or older, or if you are at risk for osteoporosis and fractures, ask your health care provider if you should be screened.  Ask your health care provider whether you should take a calcium or vitamin D supplement to lower your risk for osteoporosis.  Menopause may have certain physical symptoms and risks.  Hormone replacement therapy may reduce some of these symptoms and risks.  Talk to your health care provider about whether hormone replacement therapy is right for you.  Follow these instructions at home:  Schedule regular health, dental, and eye exams.  Stay current with your immunizations.  Do not use any tobacco products including cigarettes, chewing tobacco, or electronic cigarettes.  If you are pregnant, do not drink alcohol.  If you are breastfeeding, limit how much and how often you drink alcohol.  Limit alcohol intake to no more than 1 drink per day for nonpregnant women. One drink equals 12 ounces of beer, 5 ounces of wine, or 1½ ounces of hard liquor.  Do not use street drugs.  Do not share needles.  Ask your health care provider for help if you need support or information about quitting drugs.  Tell your health care provider if you often feel depressed.  Tell your health care provider if you have ever been abused or do  not feel safe at home.  This information is not intended to replace advice given to you by your health care provider. Make sure you discuss any questions you have with your health care provider.  Document Released: 07/02/2012 Document Revised: 05/25/2017 Document Reviewed: 09/20/2016  [employer] Patient Education © 2018 [employer]

## 2025-07-11 NOTE — PROGRESS NOTES
New Patient Office Visit      Date: 2025  Patient Name: Sharon Ahn  : 1945   MRN: [id number]     Chief Complaint   Patient presents with   • Establish Care     Previously seen with Dr. Moody       History of Present Illness:     Sharon Ahn is an 79-year-old female, a previous patient of Dr. Moody here to establish care with a new provider.    Sharon has experienced three falls since last year, on 10/24/2024, 2025, and 2025, resulting in bruises but no fractures. She did not seek hospital treatment for these incidents. Due to lymphedema, she was unable to stand up after the falls and required assistance from her neighbor. She lives with her , who has a prosthesis and was unable to help her. She has been using a cane since the falls and has not had any further falls since then. She reports no dizziness prior to the falls or decreased sensation in her feet. She believes her last fall was due to a loss of balance. She has not visited a lymphedema clinic and is unsure of the cause of her condition.    She is currently taking spironolactone and furosemide, the latter of which she takes as needed for leg swelling. She is also taking levothyroxine 50 mcg and 75 mcg daily in the morning with a small amount of water, as prescribed by Dr. Montelongo. She does not take any other medications within an hour of this dose.     She has been trying to lose weight by eating less, but admits that her diet is not always nutritious. She has lost about 20 pounds since 2024. She reports previous DEXA scan and that she was told her bones were in good condition. She has not had a mammogram in a long time and has never had an abnormal result.    She has been experiencing hair loss and has an appointment with a dermatologist in 2025. She also reports voice changes and but denies difficulty swallowing.    Sharon tells me she has been experiencing bowel issues, including diarrhea and  constipation, which have made it difficult for her to schedule appointments. She occasionally experiences urgency and incomplete bowel movements, leading to multiple bathroom visits per day. She was diagnosed with IBS 2 to 3 years ago and diverticulitis several years ago and perforation. She takes Metamucil and admits to not drinking enough water.    She underwent hernia surgery 2 to 3 years ago and was told the issue would resolve, but it has not. She is considering returning to see the surgeon.    PAST SURGICAL HISTORY:  Hernia surgery: 2 to 3 years ago       Subjective     Past Medical History:   Diagnosis Date   • Benign essential HTN    • Chest pain 2004    NO SIG CZ BY IVUS   • Chronic edema    • Diverticulosis     FIBER   • DJD (degenerative joint disease)    • DM (diabetes mellitus)    • GERD (gastroesophageal reflux disease)    • Gout    • Hiatal hernia    • HLP (hyperkeratosis lenticularis perstans)    • Hypothyroidism    • Lymphedema of lower extremity    • Migraine    • Seizure disorder    • Sleep apnea    • UTI (urinary tract infection)        Past Surgical History:   Procedure Laterality Date   • APPENDECTOMY      INCIDENTAL   • CHOLECYSTECTOMY      OPEN   • HERNIA REPAIR      REPAIR W/DONUT  DONUT REMOVAL  2019 [facility]   • HYSTERECTOMY     • LIVER BIOPSY         Family History   Problem Relation Age of Onset   • Alzheimer's disease Mother    • Heart attack Father    • Stroke Sister    • Alzheimer's disease Sister    • Other Brother         GLIOBLASTOMA   • No Known Problems Maternal Grandmother    • No Known Problems Maternal Grandfather    • No Known Problems Paternal Grandmother    • No Known Problems Paternal Grandfather        Social History     Socioeconomic History   • Marital status:      Spouse name: Familia   • Number of children: 0   Tobacco Use   • Smoking status: Never     Passive exposure: Never   • Smokeless tobacco: Never   Vaping Use   • Vaping status: Never Used   Substance and  Sexual Activity   • Alcohol use: Never   • Drug use: Never   • Sexual activity: Not Currently       Current Outpatient Medications   Medication Instructions   • dicyclomine (BENTYL) 10 MG capsule TAKE ONE CAPSULE BY MOUTH FOUR TIMES DAILY BEFORE MEALS AS NEEDED FOR DIARRHEA   • febuxostat (ULORIC) 40 mg, Oral, Daily   • furosemide (LASIX) 20 mg, Oral, 2 Times Daily PRN   • glucose blood test strip 1 each, Other, Daily, Use as instructed   • Hydrocortisone, Perianal, (Anusol-HC) 2.5 % rectal cream Rectal, 2 Times Daily   • Lancets 33G misc 1 each, Not Applicable, Daily   • losartan (COZAAR) 100 mg, Oral, Daily   • metFORMIN (GLUCOPHAGE) 1,000 mg, Oral, 2 Times Daily With Meals   • multivitamin with minerals tablet tablet 1 tablet, Daily   • nystatin (MYCOSTATIN) 576577 UNIT/GM powder Topical, 3 Times Daily   • Qulipta 60 mg, Oral, Daily   • sertraline (ZOLOFT) 25 mg, Oral, Daily   • spironolactone (ALDACTONE) 25 mg, Oral, Daily   • topiramate (TOPAMAX) 100 MG tablet TAKE 1 TABLET BY MOUTH EVERY MORNING AND TAKE 1 TABLET BY MOUTH EVERY EVENING.        Allergies   Allergen Reactions   • Amoxicillin Rash   • Codeine GI Intolerance       Health Maintenance Summary            Current Care Gaps       ANNUAL WELLNESS VISIT (Yearly) Overdue since 9/19/2024 09/19/2023  Level of Service: [place] PPPS, SUBSEQ VISIT    06/09/2022  Level of Service: [place] PPPS, SUBSEQ VISIT              DIABETIC FOOT EXAM (Yearly) Never done     No completion, postpone, or frequency change history exists for this topic.              DIABETIC EYE EXAM (Yearly) Never done     No completion, postpone, or frequency change history exists for this topic.                      Awaiting Completion       URINE MICROALBUMIN-CREATININE RATIO (uACR) (Yearly) Order placed this encounter      07/11/2025  Order placed for Microalbumin / Creatinine Urine Ratio - Urine, Clean Catch by Adrienne Ahn APRN              DXA SCAN (Every 2 Years) Order placed this  encounter      07/11/2025  Order placed for DEXA Bone Density Axial by Adreinne Ahn APRN    09/19/2023  Postponed until 9/19/2023 by Sissy Moody DO (Patient Refused)    10/04/2018  DEXA Scan              HEMOGLOBIN A1C (Every 6 Months) Order placed this encounter      07/11/2025  Order placed for Hemoglobin A1c by Adrienne Ahn APRN    08/19/2024  Hemoglobin A1C component of POC Glycosylated Hemoglobin (Hb A1C)    09/19/2023  Hemoglobin A1C component of Hemoglobin A1c    01/04/2023  Hemoglobin A1C component of Hemoglobin A1c    06/09/2022  Hemoglobin A1C component of Hemoglobin A1c     Only the first 5 history entries have been loaded, but more history exists.                    Upcoming       COVID-19 Vaccine (4 - 2024-25 season) Postponed until 7/25/2025 07/11/2025  Postponed until 7/25/2025 by Cherrie Carver MA (Product Unavailable)    09/19/2023  Postponed until 12/9/2023 by Sissy Moody DO (Product Unavailable)    12/23/2021  Imm Admin: COVID-19 (MODERNA) 1st,2nd,3rd Dose Monovalent    03/31/2021  Imm Admin: COVID-19 (MODERNA) 1st,2nd,3rd Dose Monovalent    03/03/2021  Imm Admin: COVID-19 (MODERNA) 1st,2nd,3rd Dose Monovalent      Only the first 5 history entries have been loaded, but more history exists.              ZOSTER VACCINE (1 of 2) Postponed until 7/25/2025 07/11/2025  Postponed until 7/25/2025 by Cherrie Carver MA (Product Unavailable)    09/19/2023  Postponed until 9/19/2023 by Sissy Moody DO (Product Unavailable)    01/20/2014  Imm Admin: Zoster, Unspecified              TDAP/TD VACCINES (1 - Tdap) Postponed until 10/24/2025      07/11/2025  Postponed until 10/24/2025 by Cherrie Carver MA (Pending event)              RSV Vaccine - Adults (1 - 1-dose 75+ series) Postponed until 7/11/2026 07/11/2025  Postponed until 7/11/2026 by Cherrie Carver MA (Product Unavailable)              INFLUENZA VACCINE (Yearly - October  "to March) Next due on 10/1/2025      11/01/2022  Imm Admin: Fluzone High-Dose 65+yrs    12/23/2021  Imm Admin: Fluzone High-Dose 65+yrs    02/18/2020  Imm Admin: Influenza, Unspecified    05/09/2019  Imm Admin: Fluzone (or Fluarix & Flulaval for VFC) >6mos    10/22/2013  Imm Admin: Influenza, Unspecified      Only the first 5 history entries have been loaded, but more history exists.                      Completed or No Longer Recommended       Pneumococcal Vaccine 50+ (Series Information) Completed      09/19/2023  Imm Admin: Pneumococcal Conjugate 20-Valent (PCV20)    02/18/2020  Imm Admin: Pneumococcal, Unspecified    01/09/2015  Imm Admin: Pneumococcal Polysaccharide (PPSV23)    10/27/2009  Imm Admin: Pneumococcal, Unspecified    11/10/2004  Imm Admin: Pneumococcal, Unspecified      Only the first 5 history entries have been loaded, but more history exists.              COLONOSCOPY  Discontinued        Frequency changed to Never automatically (Topic No Longer Applies)    06/18/2020  Outside Claim: [facility] COL[facility] W/BIOPSY SINGLE/MULTIPLE    05/23/2006  Colonoscopy                             Objective     Vitals:    07/11/25 1037   BP: 128/68   BP Location: Right arm   Patient Position: Sitting   Cuff Size: Adult   Pulse: 83   Resp: 16   Temp: 98.3 °F (36.8 °C)   TempSrc: Oral   SpO2: 99%   Weight: 71.4 kg (157 lb 4.8 oz)   Height: 162.6 cm (64\")   PainSc: 0-No pain        Physical Exam  Vitals and nursing note reviewed.   Constitutional:       General: She is not in acute distress.  HENT:      Head: Normocephalic.      Right Ear: Tympanic membrane normal.      Left Ear: Tympanic membrane normal.      Nose: Nose normal.      Mouth/Throat:      Mouth: Mucous membranes are moist.   Eyes:      Pupils: Pupils are equal, round, and reactive to light.   Neck:      Thyroid: No thyromegaly or thyroid tenderness.   Cardiovascular:      Rate and Rhythm: Normal rate and regular rhythm.      Pulses: Normal pulses.      Heart " sounds: Normal heart sounds.   Pulmonary:      Effort: Pulmonary effort is normal.      Breath sounds: Normal breath sounds.   Abdominal:      General: Bowel sounds are normal.      Palpations: Abdomen is soft.   Musculoskeletal:         General: Normal range of motion.      Cervical back: Normal range of motion.      Right lower leg: 3+ Edema present.      Left lower leg: 3+ Edema present.   Lymphadenopathy:      Cervical: No cervical adenopathy.   Skin:     General: Skin is warm and dry.      Capillary Refill: Capillary refill takes less than 2 seconds.   Neurological:      Mental Status: She is alert and oriented to person, place, and time.   Psychiatric:         Mood and Affect: Mood normal.         Behavior: Behavior normal.           Assessment / Plan         1. Establishment of care.  - Sharon Ahn is her to establish care with a new provider.  - A comprehensive blood work will be conducted today to assess her overall health status, including blood count, kidney function, and liver function.  - Review of her current medications and medical history was performed.  - Follow-up appointment scheduled in a couple of weeks to review blood work results and address any ongoing issues.    2. Hypothyroidism.  - Currently taking levothyroxine 50 mcg and 75 mcg daily, totaling 125 mcg.  - Blood work will be done today to check her thyroid levels.  - Will continue her current regimen until the results are available.  - Adjustments to medication will be made based on blood work results if necessary.    3. Bowel issues.  - Reports alternating diarrhea and constipation, not currently taking dicyclomine.  - Advised to maintain adequate hydration and fiber intake.  - Blood work will be done today to further investigate her symptoms.  - Further evaluation may be necessary if symptoms persist.    4. Fall risk.  - Experienced three falls since last year, with no significant injuries but reports bruising.  - Referral to physical  therapy for strength and balance training has been made.  - Advised to inform the office immediately if another fall occurs before the next appointment.  - Using 4 prong cane has helped prevent further falls since the last incident.    Summary and Recommendations  Comprehensive blood work to assess overall health status, including blood count, kidney function, and liver function.  Continue current levothyroxine regimen until blood work results are available; adjustments to medication will be made based on results if necessary.  Maintain adequate hydration and fiber intake to manage bowel issues.  Referral to physical therapy for strength and balance training.  Follow-up appointment scheduled in a couple of weeks to review blood work results and address ongoing issues.  Referral for DEXA scan to assess bone density.  Referral to a new surgeon for hernia surgery follow-up.       (Z00.00) Annual physical exam - Plan: CBC Auto Differential, Comprehensive Metabolic Panel, Lipid Panel    (E11.9) Type 2 diabetes mellitus without complication, without long-term current use of insulin - Plan: Microalbumin / Creatinine Urine Ratio - Urine, Clean Catch    (Z78.0) Postmenopause - Plan: DEXA Bone Density Axial    (Z13.1) Screening for diabetes mellitus (DM) - Plan: Hemoglobin A1c    (Z13.29) Screening for thyroid disorder - Plan: TSH Rfx On Abnormal To Free T4    (R29.6) Falls frequently - Plan: Ambulatory Referral to Physical Therapy for Evaluation & Treatment     Healthcare Maintenance:  Counseling provided based on age appropriate USPSTF guidelines.  BMI is >= 25 and <30. (Overweight) The following options were offered after discussion;: information on healthy weight added to patient's after visit summary     Patient Education:   Reviewed medications, potential side effects and signs and symptoms to report.   Patient education materials attached to AVS and reviewed with patient during office visit today.   Addressed questions  and concerns during visit. Patient verbalized understanding and agrees with tx plan.   Instructed to call the office with any questions and report to ER with any life-threatening symptoms.    Return in about 2 weeks (around 7/25/2025) for Recheck, Please schedule next visit in 30-minute slot.    [facility]  [address] [facility] [id number] [phone]    Patient or patient representative verbalized consent for the use of Ambient Listening during the visit with  VADIM Cates for chart documentation. 7/11/2025  11:02 EDT    NOTE TO PATIENT:   The 21st Century Cures Act makes medical notes like these available to patients in the interest of transparency. However, be advised this is a medical document. It is intended as peer to peer communication. It is written in medical language and may contain abbreviations or verbiage that are unfamiliar. It may appear blunt or direct. Medical documents are intended to carry relevant information, facts as evident, and the clinical opinion of the practitioner.     EMR Dragon/Transcription disclaimer:   Much of this encounter note is an electronic transcription of spoken language to printed text. Electronic transcription of spoken language may permit erroneous, or at times, nonsensical words or phrases to be inadvertently transcribed. Although I have reviewed the note for such errors, some may still exist.

## 2025-07-12 ENCOUNTER — PRIOR AUTHORIZATION (OUTPATIENT)
Dept: FAMILY MEDICINE CLINIC | Facility: CLINIC | Age: 80
End: 2025-07-12
Payer: MEDICARE

## 2025-08-05 ENCOUNTER — OFFICE VISIT (OUTPATIENT)
Dept: FAMILY MEDICINE CLINIC | Facility: CLINIC | Age: 80
End: 2025-08-05
Payer: MEDICARE

## 2025-08-05 VITALS
BODY MASS INDEX: 27.13 KG/M2 | TEMPERATURE: 97.9 F | HEIGHT: 64 IN | DIASTOLIC BLOOD PRESSURE: 74 MMHG | HEART RATE: 85 BPM | OXYGEN SATURATION: 99 % | WEIGHT: 158.9 LBS | SYSTOLIC BLOOD PRESSURE: 124 MMHG | RESPIRATION RATE: 16 BRPM

## 2025-08-05 DIAGNOSIS — Z13.1 SCREENING FOR DIABETES MELLITUS (DM): ICD-10-CM

## 2025-08-05 DIAGNOSIS — I89.0 LYMPHEDEMA OF BOTH LOWER EXTREMITIES: Primary | ICD-10-CM

## 2025-08-05 DIAGNOSIS — Z13.29 SCREENING FOR THYROID DISORDER: ICD-10-CM

## 2025-08-05 DIAGNOSIS — Z00.00 ANNUAL PHYSICAL EXAM: ICD-10-CM

## 2025-08-05 RX ORDER — LOSARTAN POTASSIUM 100 MG/1
100 TABLET ORAL DAILY
Qty: 90 TABLET | Refills: 1 | Status: SHIPPED | OUTPATIENT
Start: 2025-08-05

## 2025-08-05 RX ORDER — TOPIRAMATE 100 MG/1
100 TABLET, FILM COATED ORAL DAILY
Qty: 180 TABLET | Refills: 1 | Status: SHIPPED | OUTPATIENT
Start: 2025-08-05

## 2025-08-05 RX ORDER — NYSTATIN 100000 [USP'U]/G
POWDER TOPICAL 3 TIMES DAILY
Qty: 60 G | Refills: 2 | Status: SHIPPED | OUTPATIENT
Start: 2025-08-05

## 2025-08-06 LAB
ALBUMIN SERPL-MCNC: 4.2 G/DL (ref 3.8–4.8)
ALP SERPL-CCNC: 86 IU/L (ref 44–121)
ALT SERPL-CCNC: 13 IU/L (ref 0–32)
AST SERPL-CCNC: 29 IU/L (ref 0–40)
BASOPHILS # BLD AUTO: 0 X10E3/UL (ref 0–0.2)
BASOPHILS NFR BLD AUTO: 1 %
BILIRUB SERPL-MCNC: 0.6 MG/DL (ref 0–1.2)
BUN SERPL-MCNC: 25 MG/DL (ref 8–27)
BUN/CREAT SERPL: 20 (ref 12–28)
CALCIUM SERPL-MCNC: 9.6 MG/DL (ref 8.7–10.3)
CHLORIDE SERPL-SCNC: 107 MMOL/L (ref 96–106)
CHOLEST SERPL-MCNC: 132 MG/DL (ref 100–199)
CO2 SERPL-SCNC: 16 MMOL/L (ref 20–29)
CREAT SERPL-MCNC: 1.23 MG/DL (ref 0.57–1)
EGFRCR SERPLBLD CKD-EPI 2021: 44 ML/MIN/1.73
EOSINOPHIL # BLD AUTO: 0.3 X10E3/UL (ref 0–0.4)
EOSINOPHIL NFR BLD AUTO: 5 %
ERYTHROCYTE [DISTWIDTH] IN BLOOD BY AUTOMATED COUNT: 13.1 % (ref 11.7–15.4)
GLOBULIN SER CALC-MCNC: 2.3 G/DL (ref 1.5–4.5)
GLUCOSE SERPL-MCNC: 123 MG/DL (ref 70–99)
HBA1C MFR BLD: 5.9 % (ref 4.8–5.6)
HCT VFR BLD AUTO: 42.2 % (ref 34–46.6)
HDLC SERPL-MCNC: 63 MG/DL
HGB BLD-MCNC: 13.7 G/DL (ref 11.1–15.9)
IMM GRANULOCYTES # BLD AUTO: 0 X10E3/UL (ref 0–0.1)
IMM GRANULOCYTES NFR BLD AUTO: 0 %
LDLC SERPL CALC-MCNC: 51 MG/DL (ref 0–99)
LYMPHOCYTES # BLD AUTO: 2.3 X10E3/UL (ref 0.7–3.1)
LYMPHOCYTES NFR BLD AUTO: 43 %
MCH RBC QN AUTO: 32 PG (ref 26.6–33)
MCHC RBC AUTO-ENTMCNC: 32.5 G/DL (ref 31.5–35.7)
MCV RBC AUTO: 99 FL (ref 79–97)
MONOCYTES # BLD AUTO: 0.5 X10E3/UL (ref 0.1–0.9)
MONOCYTES NFR BLD AUTO: 9 %
NEUTROPHILS # BLD AUTO: 2.2 X10E3/UL (ref 1.4–7)
NEUTROPHILS NFR BLD AUTO: 42 %
PLATELET # BLD AUTO: 174 X10E3/UL (ref 150–450)
POTASSIUM SERPL-SCNC: 4 MMOL/L (ref 3.5–5.2)
PROT SERPL-MCNC: 6.5 G/DL (ref 6–8.5)
RBC # BLD AUTO: 4.28 X10E6/UL (ref 3.77–5.28)
SODIUM SERPL-SCNC: 139 MMOL/L (ref 134–144)
T4 FREE SERPL-MCNC: 2.34 NG/DL (ref 0.82–1.77)
TRIGL SERPL-MCNC: 96 MG/DL (ref 0–149)
TSH SERPL DL<=0.005 MIU/L-ACNC: 0.02 UIU/ML (ref 0.45–4.5)
VLDLC SERPL CALC-MCNC: 18 MG/DL (ref 5–40)
WBC # BLD AUTO: 5.3 X10E3/UL (ref 3.4–10.8)

## 2025-08-18 ENCOUNTER — TELEPHONE (OUTPATIENT)
Dept: FAMILY MEDICINE CLINIC | Facility: CLINIC | Age: 80
End: 2025-08-18
Payer: MEDICARE

## 2025-08-20 DIAGNOSIS — E03.9 ACQUIRED HYPOTHYROIDISM: ICD-10-CM

## 2025-08-21 RX ORDER — LEVOTHYROXINE SODIUM 75 UG/1
75 TABLET ORAL
Qty: 90 TABLET | Refills: 0 | OUTPATIENT
Start: 2025-08-21

## 2025-08-22 DIAGNOSIS — E03.9 ACQUIRED HYPOTHYROIDISM: Primary | ICD-10-CM

## 2025-08-22 DIAGNOSIS — K58.0 IRRITABLE BOWEL SYNDROME WITH DIARRHEA: ICD-10-CM

## 2025-08-22 RX ORDER — DICYCLOMINE HYDROCHLORIDE 10 MG/1
CAPSULE ORAL
Qty: 90 CAPSULE | Refills: 1 | Status: SHIPPED | OUTPATIENT
Start: 2025-08-22

## 2025-08-22 RX ORDER — LEVOTHYROXINE SODIUM 112 UG/1
112 TABLET ORAL DAILY
Qty: 30 TABLET | Refills: 1 | Status: SHIPPED | OUTPATIENT
Start: 2025-08-22